# Patient Record
Sex: MALE | Race: ASIAN | NOT HISPANIC OR LATINO | Employment: FULL TIME | URBAN - METROPOLITAN AREA
[De-identification: names, ages, dates, MRNs, and addresses within clinical notes are randomized per-mention and may not be internally consistent; named-entity substitution may affect disease eponyms.]

---

## 2023-08-22 NOTE — CONFIDENTIAL NOTE
RECORDS RECEIVED FROM: internal /ce   DATE RECEIVED: 9.13.23    NOTES (FOR ALL VISITS) STATUS DETAILS   OFFICE NOTES from referring provider internal  Self referred, per patient records are at     OFFICE NOTES from other specialist ce - 5/8/23, 5.1.23- Bill      MEDICATION LIST internal       LABS     DIABETES: HBGA1C, CREATININE, FASTING LIPIDS, MICROALBUMIN URINE, POTASSIUM, TSH, T4    THYROID: TSH, T4, CBC, THYRODLONULIN, TOTAL T3, FREE T4, CALCITONIN, CEA ce  HBGA1C- 5.1.23  [facility]- 5.1.23           
independent

## 2023-09-13 ENCOUNTER — LAB (OUTPATIENT)
Dept: LAB | Facility: CLINIC | Age: 53
End: 2023-09-13
Payer: COMMERCIAL

## 2023-09-13 ENCOUNTER — PRE VISIT (OUTPATIENT)
Dept: ENDOCRINOLOGY | Facility: CLINIC | Age: 53
End: 2023-09-13

## 2023-09-13 ENCOUNTER — OFFICE VISIT (OUTPATIENT)
Dept: ENDOCRINOLOGY | Facility: CLINIC | Age: 53
End: 2023-09-13
Payer: COMMERCIAL

## 2023-09-13 VITALS
SYSTOLIC BLOOD PRESSURE: 166 MMHG | HEIGHT: 64 IN | OXYGEN SATURATION: 96 % | TEMPERATURE: 98.5 F | DIASTOLIC BLOOD PRESSURE: 95 MMHG | WEIGHT: 219 LBS | HEART RATE: 94 BPM | BODY MASS INDEX: 37.39 KG/M2

## 2023-09-13 DIAGNOSIS — E11.42 TYPE 2 DIABETES MELLITUS WITH DIABETIC POLYNEUROPATHY, WITH LONG-TERM CURRENT USE OF INSULIN (H): Primary | ICD-10-CM

## 2023-09-13 DIAGNOSIS — E11.9 DIABETES MELLITUS (H): ICD-10-CM

## 2023-09-13 DIAGNOSIS — Z79.4 TYPE 2 DIABETES MELLITUS WITH DIABETIC POLYNEUROPATHY, WITH LONG-TERM CURRENT USE OF INSULIN (H): Primary | ICD-10-CM

## 2023-09-13 LAB
ALBUMIN SERPL BCG-MCNC: 4.3 G/DL (ref 3.5–5.2)
ALP SERPL-CCNC: 96 U/L (ref 40–129)
ALT SERPL W P-5'-P-CCNC: 33 U/L (ref 0–70)
ANION GAP SERPL CALCULATED.3IONS-SCNC: 11 MMOL/L (ref 7–15)
AST SERPL W P-5'-P-CCNC: 24 U/L (ref 0–45)
BILIRUB SERPL-MCNC: 0.3 MG/DL
BUN SERPL-MCNC: 31.2 MG/DL (ref 6–20)
CALCIUM SERPL-MCNC: 9.5 MG/DL (ref 8.6–10)
CHLORIDE SERPL-SCNC: 101 MMOL/L (ref 98–107)
CHOLEST SERPL-MCNC: 140 MG/DL
CREAT SERPL-MCNC: 2.18 MG/DL (ref 0.67–1.17)
CREAT UR-MCNC: 62.5 MG/DL
DEPRECATED HCO3 PLAS-SCNC: 23 MMOL/L (ref 22–29)
EGFRCR SERPLBLD CKD-EPI 2021: 36 ML/MIN/1.73M2
ERYTHROCYTE [DISTWIDTH] IN BLOOD BY AUTOMATED COUNT: 12.4 % (ref 10–15)
GLUCOSE SERPL-MCNC: 483 MG/DL (ref 70–99)
HBA1C MFR BLD: 9.5 % (ref 4.3–?)
HCT VFR BLD AUTO: 35.6 % (ref 40–53)
HDLC SERPL-MCNC: 47 MG/DL
HGB BLD-MCNC: 12.5 G/DL (ref 13.3–17.7)
LDLC SERPL CALC-MCNC: 39 MG/DL
MCH RBC QN AUTO: 29.4 PG (ref 26.5–33)
MCHC RBC AUTO-ENTMCNC: 35.1 G/DL (ref 31.5–36.5)
MCV RBC AUTO: 84 FL (ref 78–100)
MICROALBUMIN UR-MCNC: 479 MG/L
MICROALBUMIN/CREAT UR: 766.4 MG/G CR (ref 0–17)
NONHDLC SERPL-MCNC: 93 MG/DL
PLATELET # BLD AUTO: 142 10E3/UL (ref 150–450)
POTASSIUM SERPL-SCNC: 4.7 MMOL/L (ref 3.4–5.3)
PROT SERPL-MCNC: 7.5 G/DL (ref 6.4–8.3)
RBC # BLD AUTO: 4.25 10E6/UL (ref 4.4–5.9)
SODIUM SERPL-SCNC: 135 MMOL/L (ref 136–145)
TRIGL SERPL-MCNC: 268 MG/DL
TSH SERPL DL<=0.005 MIU/L-ACNC: 1.82 UIU/ML (ref 0.3–4.2)
WBC # BLD AUTO: 7.7 10E3/UL (ref 4–11)

## 2023-09-13 PROCEDURE — 85027 COMPLETE CBC AUTOMATED: CPT | Performed by: PATHOLOGY

## 2023-09-13 PROCEDURE — 82570 ASSAY OF URINE CREATININE: CPT | Performed by: STUDENT IN AN ORGANIZED HEALTH CARE EDUCATION/TRAINING PROGRAM

## 2023-09-13 PROCEDURE — 99000 SPECIMEN HANDLING OFFICE-LAB: CPT | Performed by: PATHOLOGY

## 2023-09-13 PROCEDURE — 99205 OFFICE O/P NEW HI 60 MIN: CPT | Performed by: STUDENT IN AN ORGANIZED HEALTH CARE EDUCATION/TRAINING PROGRAM

## 2023-09-13 PROCEDURE — 36415 COLL VENOUS BLD VENIPUNCTURE: CPT | Performed by: PATHOLOGY

## 2023-09-13 PROCEDURE — 80053 COMPREHEN METABOLIC PANEL: CPT | Performed by: PATHOLOGY

## 2023-09-13 PROCEDURE — 84443 ASSAY THYROID STIM HORMONE: CPT | Performed by: PATHOLOGY

## 2023-09-13 PROCEDURE — 80061 LIPID PANEL: CPT | Performed by: PATHOLOGY

## 2023-09-13 PROCEDURE — 83036 HEMOGLOBIN GLYCOSYLATED A1C: CPT | Performed by: STUDENT IN AN ORGANIZED HEALTH CARE EDUCATION/TRAINING PROGRAM

## 2023-09-13 RX ORDER — CLONIDINE HYDROCHLORIDE 0.1 MG/1
1 TABLET ORAL 2 TIMES DAILY
COMMUNITY
Start: 2023-08-07 | End: 2024-03-27

## 2023-09-13 RX ORDER — ROSUVASTATIN CALCIUM 10 MG/1
1 TABLET, COATED ORAL DAILY
COMMUNITY
Start: 2023-08-10

## 2023-09-13 RX ORDER — PREGABALIN 75 MG/1
1 CAPSULE ORAL AT BEDTIME
COMMUNITY
Start: 2023-08-07 | End: 2023-11-29 | Stop reason: DRUGHIGH

## 2023-09-13 RX ORDER — INSULIN GLARGINE-YFGN 100 [IU]/ML
35 INJECTION, SOLUTION SUBCUTANEOUS DAILY
Qty: 10 ML | Refills: 0 | Status: SHIPPED | OUTPATIENT
Start: 2023-09-13 | End: 2023-09-13

## 2023-09-13 RX ORDER — LANCETS
EACH MISCELLANEOUS
COMMUNITY
Start: 2023-07-26

## 2023-09-13 RX ORDER — METOPROLOL SUCCINATE 100 MG/1
100 TABLET, EXTENDED RELEASE ORAL DAILY
COMMUNITY
Start: 2023-09-07 | End: 2024-09-06

## 2023-09-13 RX ORDER — FLASH GLUCOSE SENSOR
KIT MISCELLANEOUS
Qty: 2 EACH | Refills: 5 | Status: SHIPPED | OUTPATIENT
Start: 2023-09-13 | End: 2023-12-13

## 2023-09-13 RX ORDER — TIRZEPATIDE 2.5 MG/.5ML
2.5 INJECTION, SOLUTION SUBCUTANEOUS
Qty: 2 ML | Refills: 0 | Status: SHIPPED | OUTPATIENT
Start: 2023-09-13 | End: 2023-10-02

## 2023-09-13 RX ORDER — INSULIN ASPART 100 [IU]/ML
15 INJECTION, SUSPENSION SUBCUTANEOUS 2 TIMES DAILY WITH MEALS
COMMUNITY
Start: 2023-08-18 | End: 2023-11-29

## 2023-09-13 RX ORDER — INSULIN GLARGINE-YFGN 100 [IU]/ML
40 INJECTION, SOLUTION SUBCUTANEOUS DAILY
Qty: 10 ML | Refills: 0 | Status: SHIPPED | OUTPATIENT
Start: 2023-09-13 | End: 2023-09-29

## 2023-09-13 RX ORDER — LOSARTAN POTASSIUM AND HYDROCHLOROTHIAZIDE 12.5; 5 MG/1; MG/1
1 TABLET ORAL DAILY
COMMUNITY
Start: 2023-08-10 | End: 2023-12-13

## 2023-09-13 RX ORDER — METFORMIN HCL 500 MG
TABLET, EXTENDED RELEASE 24 HR ORAL
COMMUNITY
Start: 2023-08-10 | End: 2023-09-13

## 2023-09-13 RX ORDER — METFORMIN HCL 500 MG
TABLET, EXTENDED RELEASE 24 HR ORAL
Qty: 240 TABLET | Refills: 3 | Status: SHIPPED | OUTPATIENT
Start: 2023-09-13 | End: 2023-12-13

## 2023-09-13 ASSESSMENT — PAIN SCALES - GENERAL: PAINLEVEL: MILD PAIN (2)

## 2023-09-13 NOTE — PROGRESS NOTES
Endocrinology Clinic Visit 9/13/2023    NAME:  Mayito Toth  PCP:  Bill Jayden KASEY  MRN:  [id number]  Reason for Consult:  uncontrolled DM2  Requesting Provider:  Referred Self    Chief Complaint     Chief Complaint   Patient presents with    Diabetes       History of Present Illness     Mayito Toth is a 52 year old male who is seen in clinic for uncontrolled MD2. Here with his wife.    The patient was diagnosed with type 2 diabetes at age of early 40s, it was found on his annual blood tests. He was on metformin for few years then placed on insulin and has been on Novolog 70/30 since then. He was managed by his physician in [facility], moved to [place] 4/2023.   He reported feeling low twice a week during the morning this past week, he does not usually feel low. No changes in his regimen for a long time. He said he was not very compliant with his insulin back in [facility] but he has been taking it more regularly since moving here. He did seem that he was struggling with language to answer my questions and I had to repeat myself multiple times; I offered him  service but he declined.    Previous A1C in records reviewed. Today A1C is 9.5 compared to previous.  Weight is up 6 kg since moving from [facility].    Wt Readings from Last 4 Encounters:   09/13/23 99.3 kg (219 lb)         Diabetes Care/Complications:   Eyes: unsure, reported blurry vision.  Kidneys: Cr 1.84 ON 9/7/23, GFR 44 on 9/7/23. Similar on labs 5/1/23. He said it was normal a year ago.   Nerves: bilateral feet numbness. No ulcer or infections. No amputation. He is on lyrica 75 mg daily. Not controlling his symptoms.   Smoking: no  Blood Pressure: BP high today. Meds losartan/Htcz 50-12.5 mg daily, metoprolol 100 mg daily,clonidine 0.1 mg bid   Lipids:  on 5/2023 on Crestor 10 mg daily  Macrovascular: no   Hypoglycemia: as above    Previous DM related Hospitalization:    Current treatment strategy:   Metformin 500 mg  daily  Novolog 70/30 40 units in am and 30 units before dinner    Blood Glucose Monitoring:   He does not check his bg. He checked once last week was 195.     Diet:   3 meals  Breakfast: bread, coffee and egg  Snack bread, coffee with sugar  Lunch: rice and meat  Dinner: rice and meat  No bedtime snack  Drinks: sometimes drinks juice and soda. Alcohol occasional.     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       Problem List     DM2  CKD stage 3b  HTN  Obesity class 2       Medications     Current Outpatient Medications   Medication    cloNIDine (CATAPRES) 0.1 MG tablet    CONTOUR NEXT TEST test strip    losartan-hydrochlorothiazide (HYZAAR) 50-12.5 MG tablet    metFORMIN (GLUCOPHAGE XR) 500 MG 24 hr tablet    metoprolol succinate ER (TOPROL XL) 100 MG 24 hr tablet    Microlet Lancets MISC    NOVOLOG MIX 70/30 FLEXPEN (70-30) 100 UNIT/ML susp    pregabalin (LYRICA) 75 MG capsule    rosuvastatin (CRESTOR) 10 MG tablet     No current facility-administered medications for this visit.        Allergies     No Known Allergies    Medical / Surgical History     DM2  CKD stage 3b  HTN  Obesity class 2       Social History     Social History     Socioeconomic History    Marital status:      Spouse name: Not on file    Number of children: Not on file    Years of education: Not on file    Highest education level: Not on file   Occupational History    Not on file   Tobacco Use    Smoking status: Never    Smokeless tobacco: Never   Substance and Sexual Activity    Alcohol use: Not on file    Drug use: Never    Sexual activity: Not on file   Other Topics Concern    Not on file   Social History Narrative    Not on file     Social Determinants of Health     Financial Resource Strain: Not on file   Food Insecurity: Not on file   Transportation Needs: Not on file   Physical Activity: Not on file   Stress: Not on file   Social Connections: Not on file   Intimate Partner  "Violence: Not on file   Housing Stability: Not on file       Family History     No family history on file.    ROS     12 ROS completed, pertinent positive and negative in HPI    Physical Exam   BP (!) 166/95 (BP Location: Left arm, Patient Position: Sitting, Cuff Size: Adult Regular)   Pulse 94   Temp 98.5  F (36.9  C) (Oral)   Ht 1.626 m (5' 4\")   Wt 99.3 kg (219 lb)   SpO2 96%   BMI 37.59 kg/m       General: Comfortable, no obvious distress, normal body habitus  Eyes: Sclera anicteric, moist conjunctiva  HENT: Atraumatic, oropharynx clear, moist mucous membranes with no mucosal ulcerations  Neck: Trachea midline, supple.    CV: normal rate.   Resp:  good effort, no evidence of loud wheezing  Abdomen:  obese, non distended.   Skin: No rashes, lesions, or subcutaneous nodules on exposed skin.   Psych: Alert and oriented x 3. Appropriate affect, good insight  Extremities: No peripheral edema  Musculoskeletal: Appropriate muscle bulk and strength  Neuro: Moves all four extremities. No focal deficits on limited exam. Gait normal.     Labs/Imaging     Pertinent Labs were reviewed and updated in EPIC and discussed briefly.  Radiology Results were  reviewed and updated in EPIC.    Summary of recent findings:   No results found for: A1C    No results found for: TSH, T4    No results found for: CR    No results for input(s): CHOL, HDL, LDL, TRIG, CHOLHDLRATIO in the last 10379 hours.    No results found for: [id number], [id number], [id number]    I personally reviewed the patient's outside records from [facility] EMR and Care Everywhere. Summary of pertinent findings in HPI.    Impression / Plan     1. Diabetes Mellitus: Type 2  2. Obesity class 2    He recently moved from [facility] and I have no records on him. Probably long history of noncompliance and poorly controlled DM2. He is on mixed insulin and seems that since he restated taking it he has been getting low BG. It looks like his insurance covers Glargine and " Mounjaro which would be a better combination for him.  I think language is a barrier here, I had to repeat myself multiple times , same thing happened with Ella when she was checking him out.  I believe he would benefit from CDE but he declined.  MTM referral to titrate his Mounjato and glargine  He has no problem increasing his metformin, will plan to titrate up slowly to 2g daily  Martin personal cgm prescribed      2. Diabetes Complications: probably his CKD is due to DM. No urine alb check. No eye exam. Both ordered. Neuropathy might improve with better BG control. He is on lyrica.      3. Blood Pressure Management: Blood pressure is uncontrolled. Currently is multiple pharmacotherapy for this. I am not sure when he started tking his BP meds. If continues to b uncontrolled, nxt plan is to increase his losartan to 100 mg daily     4.Lipid Management: Per the new ACC/PETRA/NHLBI guidelines, statins are recommended for individuals with diabetes aged 40-75 with LDL  without ASCVD, and for any individual with ASCVD. Currently the patient is on a statin.      5. Smoking Status: Patient Pt is smoke free..     Review of the result(s) of each unique test - A1c and diabetes related labs.  60 minutes spent on the date of the encounter doing chart review, history and exam, documentation and further activities per the note           Test and/or medications prescribed today:  Orders Placed This Encounter   Procedures    Hemoglobin A1c POCT         Follow up: 3 month      Jayson Castellano MD  Endocrinology, Diabetes and Metabolism  [facility]

## 2023-09-13 NOTE — LETTER
9/13/2023       RE: Mayito Toth  [address]  [place] [address]     Dear Colleague,    Thank you for referring your patient, Mayito Toth, to the [facility] ENDOCRIN[facility] [place] at [facility]. Please see a copy of my visit note below.    Endocrinology Clinic Visit 9/13/2023    NAME:  Mayito Toth  PCP:  Jayden Khan  MRN:  [id number]  Reason for Consult:  uncontrolled DM2  Requesting Provider:  Referred Self    Chief Complaint     Chief Complaint   Patient presents with    Diabetes       History of Present Illness     Mayito Toth is a 52 year old male who is seen in clinic for uncontrolled MD2. Here with his wife.    The patient was diagnosed with type 2 diabetes at age of early 40s, it was found on his annual blood tests. He was on metformin for few years then placed on insulin and has been on Novolog 70/30 since then. He was managed by his physician in [facility], moved to [place] 4/2023.   He reported feeling low twice a week during the morning this past week, he does not usually feel low. No changes in his regimen for a long time. He said he was not very compliant with his insulin back in [facility] but he has been taking it more regularly since moving here. He did seem that he was struggling with language to answer my questions and I had to repeat myself multiple times; I offered him  service but he declined.    Previous A1C in records reviewed. Today A1C is 9.5 compared to previous.  Weight is up 6 kg since moving from [facility].    Wt Readings from Last 4 Encounters:   09/13/23 99.3 kg (219 lb)         Diabetes Care/Complications:   Eyes: unsure, reported blurry vision.  Kidneys: Cr 1.84 ON 9/7/23, GFR 44 on 9/7/23. Similar on labs 5/1/23. He said it was normal a year ago.   Nerves: bilateral feet numbness. No ulcer or infections. No amputation. He is on lyrica 75 mg daily. Not controlling his symptoms.    Smoking: no  Blood Pressure: BP high today. Meds losartan/Htcz 50-12.5 mg daily, metoprolol 100 mg daily,clonidine 0.1 mg bid   Lipids:  on 5/2023 on Crestor 10 mg daily  Macrovascular: no   Hypoglycemia: as above    Previous DM related Hospitalization:    Current treatment strategy:   Metformin 500 mg daily  Novolog 70/30 40 units in am and 30 units before dinner    Blood Glucose Monitoring:   He does not check his bg. He checked once last week was 195.     Diet:   3 meals  Breakfast: bread, coffee and egg  Snack bread, coffee with sugar  Lunch: rice and meat  Dinner: rice and meat  No bedtime snack  Drinks: sometimes drinks juice and soda. Alcohol occasional.     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       Problem List     DM2  CKD stage 3b  HTN  Obesity class 2       Medications     Current Outpatient Medications   Medication    cloNIDine (CATAPRES) 0.1 MG tablet    CONTOUR NEXT TEST test strip    losartan-hydrochlorothiazide (HYZAAR) 50-12.5 MG tablet    metFORMIN (GLUCOPHAGE XR) 500 MG 24 hr tablet    metoprolol succinate ER (TOPROL XL) 100 MG 24 hr tablet    Microlet Lancets MISC    NOVOLOG MIX 70/30 FLEXPEN (70-30) 100 UNIT/ML susp    pregabalin (LYRICA) 75 MG capsule    rosuvastatin (CRESTOR) 10 MG tablet     No current facility-administered medications for this visit.        Allergies     No Known Allergies    Medical / Surgical History     DM2  CKD stage 3b  HTN  Obesity class 2       Social History     Social History     Socioeconomic History    Marital status:      Spouse name: Not on file    Number of children: Not on file    Years of education: Not on file    Highest education level: Not on file   Occupational History    Not on file   Tobacco Use    Smoking status: Never    Smokeless tobacco: Never   Substance and Sexual Activity    Alcohol use: Not on file    Drug use: Never    Sexual activity: Not on file   Other Topics Concern     "Not on file   Social History Narrative    Not on file     Social Determinants of Health     Financial Resource Strain: Not on file   Food Insecurity: Not on file   Transportation Needs: Not on file   Physical Activity: Not on file   Stress: Not on file   Social Connections: Not on file   Intimate Partner Violence: Not on file   Housing Stability: Not on file       Family History     No family history on file.    ROS     12 ROS completed, pertinent positive and negative in HPI    Physical Exam   BP (!) 166/95 (BP Location: Left arm, Patient Position: Sitting, Cuff Size: Adult Regular)   Pulse 94   Temp 98.5  F (36.9  C) (Oral)   Ht 1.626 m (5' 4\")   Wt 99.3 kg (219 lb)   SpO2 96%   BMI 37.59 kg/m       General: Comfortable, no obvious distress, normal body habitus  Eyes: Sclera anicteric, moist conjunctiva  HENT: Atraumatic, oropharynx clear, moist mucous membranes with no mucosal ulcerations  Neck: Trachea midline, supple.    CV: normal rate.   Resp:  good effort, no evidence of loud wheezing  Abdomen:  obese, non distended.   Skin: No rashes, lesions, or subcutaneous nodules on exposed skin.   Psych: Alert and oriented x 3. Appropriate affect, good insight  Extremities: No peripheral edema  Musculoskeletal: Appropriate muscle bulk and strength  Neuro: Moves all four extremities. No focal deficits on limited exam. Gait normal.     Labs/Imaging     Pertinent Labs were reviewed and updated in EPIC and discussed briefly.  Radiology Results were  reviewed and updated in EPIC.    Summary of recent findings:   No results found for: A1C    No results found for: TSH, T4    No results found for: CR    No results for input(s): CHOL, HDL, LDL, TRIG, CHOLHDLRATIO in the last 84933 hours.    No results found for: [id number], [id number], [id number]    I personally reviewed the patient's outside records from [facility] EMR and Care Everywhere. Summary of pertinent findings in HPI.    Impression / Plan     1. Diabetes Mellitus: " Type 2  2. Obesity class 2    He recently moved from [facility] and I have no records on him. Probably long history of noncompliance and poorly controlled DM2. He is on mixed insulin and seems that since he restated taking it he has been getting low BG. It looks like his insurance covers Glargine and Mounjaro which would be a better combination for him.  I think language is a barrier here, I had to repeat myself multiple times , same thing happened with Ella when she was checking him out.  I believe he would benefit from CDE but he declined.  MTM referral to titrate his Mounjato and glargine  He has no problem increasing his metformin, will plan to titrate up slowly to 2g daily  Martin personal cgm prescribed      2. Diabetes Complications: probably his CKD is due to DM. No urine alb check. No eye exam. Both ordered. Neuropathy might improve with better BG control. He is on lyrica.      3. Blood Pressure Management: Blood pressure is uncontrolled. Currently is multiple pharmacotherapy for this. I am not sure when he started tking his BP meds. If continues to b uncontrolled, nxt plan is to increase his losartan to 100 mg daily     4.Lipid Management: Per the new ACC/PETRA/NHLBI guidelines, statins are recommended for individuals with diabetes aged 40-75 with LDL  without ASCVD, and for any individual with ASCVD. Currently the patient is on a statin.      5. Smoking Status: Patient Pt is smoke free..     Review of the result(s) of each unique test - A1c and diabetes related labs.  60 minutes spent on the date of the encounter doing chart review, history and exam, documentation and further activities per the note           Test and/or medications prescribed today:  Orders Placed This Encounter   Procedures    Hemoglobin A1c POCT         Follow up: 3 month      Jayson Castellano MD  Endocrinology, Diabetes and Metabolism  [facility]

## 2023-09-13 NOTE — NURSING NOTE
"Chief Complaint   Patient presents with    Diabetes     Vital signs:  Temp: 98.5  F (36.9  C) Temp src: Oral BP: (!) 166/95 Pulse: 94     SpO2: 96 %     Height: 162.6 cm (5' 4\") Weight: 99.3 kg (219 lb)  Estimated body mass index is 37.59 kg/m  as calculated from the following:    Height as of this encounter: 1.626 m (5' 4\").    Weight as of this encounter: 99.3 kg (219 lb).        "

## 2023-09-13 NOTE — PATIENT INSTRUCTIONS
It was nice meeting you.    Today we discussed:  - increase Metformin as discussed slowly to a total of 2 g ( I wort it on your prescription)  - stop your mix insulin and start Glargine 40 units daily  - start Mounjaro with monthly titration  - personal CGM prescribed  - schedule appointment with diabetes educator and Pharm D.

## 2023-09-29 DIAGNOSIS — E11.42 TYPE 2 DIABETES MELLITUS WITH DIABETIC POLYNEUROPATHY, WITH LONG-TERM CURRENT USE OF INSULIN (H): ICD-10-CM

## 2023-09-29 DIAGNOSIS — Z79.4 TYPE 2 DIABETES MELLITUS WITH DIABETIC POLYNEUROPATHY, WITH LONG-TERM CURRENT USE OF INSULIN (H): ICD-10-CM

## 2023-09-29 RX ORDER — INSULIN GLARGINE-YFGN 100 [IU]/ML
40 INJECTION, SOLUTION SUBCUTANEOUS DAILY
Qty: 15 ML | Refills: 1 | Status: SHIPPED | OUTPATIENT
Start: 2023-09-29 | End: 2023-12-26 | Stop reason: ALTCHOICE

## 2023-09-29 NOTE — TELEPHONE ENCOUNTER
Insulin Glargine-yfgn (SEMGLEE, YFGN,) 100 UNIT/ML SOLN         Last Written Prescription Date:  9/13/23  Last Fill Quantity: 10 ml,   # refills: 0  Last Office Visit : 9/13/23  Future Office visit:  12/13/23    Routing refill request to provider for review/approval because:  Insulin - refilled per clinic

## 2023-10-02 DIAGNOSIS — E11.42 TYPE 2 DIABETES MELLITUS WITH DIABETIC POLYNEUROPATHY, WITH LONG-TERM CURRENT USE OF INSULIN (H): ICD-10-CM

## 2023-10-02 DIAGNOSIS — Z79.4 TYPE 2 DIABETES MELLITUS WITH DIABETIC POLYNEUROPATHY, WITH LONG-TERM CURRENT USE OF INSULIN (H): ICD-10-CM

## 2023-10-03 RX ORDER — TIRZEPATIDE 2.5 MG/.5ML
2.5 INJECTION, SOLUTION SUBCUTANEOUS
Qty: 2 ML | Refills: 0 | Status: SHIPPED | OUTPATIENT
Start: 2023-10-03 | End: 2023-10-26

## 2023-10-03 NOTE — TELEPHONE ENCOUNTER
tirzepatide (MOUNJARO) 2.5 MG/0.5ML pen     Last Written Prescription Date:   9/13/2023  Last Fill Quantity: 2,   # refills: 0  Last Office Visit :  9/13/2023  Future Office visit:  12/13/2023    Routing refill request to provider for review/approval because:  Abnormal Creatinine  Refer to clinic for review   Recent Labs   Lab Test 09/13/23  1144   CR 2.18*     Ayana Waller RN  Central Triage Red Flags/Med Refills    GLP-1 Agonists Protocol Nnacim23/02/2023 01:53 PM   Protocol Details Normal serum creatinine on file in past 12 months

## 2023-10-23 NOTE — PROGRESS NOTES
"Disease State Management Encounter:                          Mayito Toth is a 52 year old male coming in for for an initial visit. He was referred to me from Dr. Castellano. Also present today is wife who helps manage his care.    Reason for visit:      Medication Adherence/Access: BCBS    Type 2 Diabetes:   Diabetes Medication(s)       Biguanides       metFORMIN (GLUCOPHAGE XR) 500 MG 24 hr tablet    1000 mg daily       Insulin       Insulin Glargine-yfgn (SEMGLEE, YFGN,) 100 UNIT/ML SOLN    Inject 35 Units Subcutaneous daily Dispense pen     NOVOLOG MIX 70/30 FLEXPEN (70-30) 100 UNIT/ML susp    INJECT 35 UNITS SUBCUTANEOUSLY IN THE MORNING AND 25 IN THE EVENING      Incretin Mimetic Agents       tirzepatide (MOUNJARO) 2.5 MG/0.5ML pen    Inject 2.5 mg Subcutaneous every 7 days     Martin ([place]) -- unable to download data today in clinic so viewed time in range on device    Time in [employer] (last 7 days):   Above - 13%  In [employer] - 87%    Statin: Yes: Crestor 10 mg daily   ACEi/ARB: No.     Urine Albumin:   Lab Results   Component Value Date    UMALCR 766.40 (H) 09/13/2023        Lab Results   Component Value Date    GFRESTIMATED 36 (L) 09/13/2023     Estimated body mass index is 37.59 kg/m  as calculated from the following:    Height as of 9/13/23: 5' 4\" (1.626 m).    Weight as of 9/13/23: 219 lb (99.3 kg).     BP Readings from Last 1 Encounters:   09/13/23 (!) 166/95     Pulse Readings from Last 1 Encounters:   09/13/23 94     Wt Readings from Last 1 Encounters:   09/13/23 219 lb (99.3 kg)     Ht Readings from Last 1 Encounters:   09/13/23 5' 4\" (1.626 m)     Estimated body mass index is 37.59 kg/m  as calculated from the following:    Height as of 9/13/23: 5' 4\" (1.626 m).    Weight as of 9/13/23: 219 lb (99.3 kg).    Temp Readings from Last 1 Encounters:   09/13/23 98.5  F (36.9  C) (Oral)   Last week of Feb going to [facility] x 2-3 months    Assessment/Plan: Although A1C above goal of < 7%, since initiation " of Mounjaro and insulin glargine, patient is now achieving time in range goal greater than 70% without hypoglycemia.  Per last visit with Dr. Castellano, patient was to replace Lantus with his NovoLog mix.  Patient is still taking NovoLog Mix at a lower dose.  It may be reasonable to taper this over the next month in anticipation of Mounjaro effect.  We will send next dose for Mounjaro instructions for tapering and defer to Emmy next week for further guidance.    INCREASE Mounjaro to 5 mg weekly on Tuesday, November 14th       On that day, DECREASE Novolog 70/30 to 30 units in the morning and 20 units in the evening    Do not increase metformin to more than 2 pills per day due to kidney function    Call me anytime if lows < 70 or if you have issues getting Mounjaro filled   [phone]    I will call you Monday, December 4th at 3 PM    Medication issues to be addressed at a future visit    Consider ACEi/ARB low dose or SGLT-2 inhibitor for clinic microalbuminuria. Watch eGFR -- currently 36      Endocrine Team & Next Follow-Up:  11/3/2023 with Emmy Serrano RN, [facility]  12/4/2023 with Bob  12/13/2023 with Dr. Castellano      I spent 30 minutes with this patient today. All changes were made via collaborative practice agreement with Dr. Castellano. A copy of the visit note was provided to the patient's provider(s).    A summary of these recommendations was given to the patient.    Bob Chung, PharmD, [facility]  Endocrine & Diabetes [facility] Pharmacist  [address] [place] [id number]  Direct Voicemail: [phone]         Medication Therapy Recommendations  Type 2 diabetes mellitus with hyperglycemia, with long-term current use of insulin (H)    Current Medication: tirzepatide (MOUNJARO) 2.5 MG/0.5ML pen (Discontinued)   Rationale: Dose too low - Dosage too low - Effectiveness   Recommendation: Increase Dose   Status: Accepted per CPA

## 2023-10-26 ENCOUNTER — TELEPHONE (OUTPATIENT)
Dept: ENDOCRINOLOGY | Facility: CLINIC | Age: 53
End: 2023-10-26

## 2023-10-26 ENCOUNTER — OFFICE VISIT (OUTPATIENT)
Dept: PHARMACY | Facility: CLINIC | Age: 53
End: 2023-10-26
Payer: COMMERCIAL

## 2023-10-26 DIAGNOSIS — E11.65 TYPE 2 DIABETES MELLITUS WITH HYPERGLYCEMIA, WITH LONG-TERM CURRENT USE OF INSULIN (H): Primary | ICD-10-CM

## 2023-10-26 DIAGNOSIS — Z79.4 TYPE 2 DIABETES MELLITUS WITH HYPERGLYCEMIA, WITH LONG-TERM CURRENT USE OF INSULIN (H): Primary | ICD-10-CM

## 2023-10-26 PROCEDURE — 99207 PR NO CHARGE LOS: CPT

## 2023-10-26 RX ORDER — TIRZEPATIDE 5 MG/.5ML
5 INJECTION, SOLUTION SUBCUTANEOUS
Qty: 2 ML | Refills: 0 | Status: SHIPPED | OUTPATIENT
Start: 2023-10-26 | End: 2023-12-27 | Stop reason: ALTCHOICE

## 2023-10-26 NOTE — PATIENT INSTRUCTIONS
INCREASE Mounjaro to 5 mg weekly on Tuesday, November 14th   On that day, DECREASE Novolog to 30 units in the morning and 20 units in the evening  Call me anytime if lows < 70 or if you have issues getting Mounjaro filled   [phone]  I will call you Monday, December 4th at 3 PM        Bob Chung, PharmD, [facility]  Endocrine & Diabetes [place] Pharmacist  [address] [place] [id number]

## 2023-10-26 NOTE — Clinical Note
"Eliecer Booth -- new patient to our clinic, so a little bit of a \"too many cooks in the kitchen\" moment to start while he meets everyone. You meet with patient 11/3. Wife helps manage his care and will probably be present. Of note, I made the mistake of not reading Dr. Castellano's note prior to meeting with patient. Intent was to REPLACE 70/30 with Lantus. Patient was taking both. I didn't question because his time in range was fantastic without lows (87%). I gave some recs to back off the 70/30 when we increase Mounjaro next month, but feel free to adjust further based on how his numbers are. I have f/up scheduled 12/4 and can help titrate Mounjaro/adjust insulin from there. Thank you :)"

## 2023-10-26 NOTE — TELEPHONE ENCOUNTER
PA Initiation    Medication: MOUNJARO 5 MG/0.5ML SC SOPN  Insurance Company: [employer] [place] - Phone [phone] Fax [phone]  Pharmacy Filling the Rx:    Filling Pharmacy Phone:    Filling Pharmacy Fax:    Start Date: 10/26/2023     Key: [id number]

## 2023-10-27 NOTE — TELEPHONE ENCOUNTER
Prior Authorization Approval    Medication: MOUNJARO 5 MG/0.5ML SC SOPN  Authorization Effective Date: 10/26/2023  Authorization Expiration Date: 10/27/2024  Approved Dose/Quantity: 2ml/28 days   Reference #: [id number]   Insurance Company: [employer] [place] - Phone [phone] Fax [phone]  Expected CoPay: $ 0  CoPay Card Available:      Financial Assistance Needed: no  Which Pharmacy is filling the prescription:    Pharmacy Notified: no  Patient Notified: pharmacy will notify patient

## 2023-11-03 ENCOUNTER — ALLIED HEALTH/NURSE VISIT (OUTPATIENT)
Dept: EDUCATION SERVICES | Facility: CLINIC | Age: 53
End: 2023-11-03
Payer: COMMERCIAL

## 2023-11-03 DIAGNOSIS — E11.42 TYPE 2 DIABETES MELLITUS WITH DIABETIC POLYNEUROPATHY, WITH LONG-TERM CURRENT USE OF INSULIN (H): ICD-10-CM

## 2023-11-03 DIAGNOSIS — Z79.4 TYPE 2 DIABETES MELLITUS WITH DIABETIC POLYNEUROPATHY, WITH LONG-TERM CURRENT USE OF INSULIN (H): ICD-10-CM

## 2023-11-03 PROCEDURE — G0108 DIAB MANAGE TRN  PER INDIV: HCPCS | Performed by: REGISTERED NURSE

## 2023-11-03 NOTE — PATIENT INSTRUCTIONS
Tonight reduce your Novolog 70/30 dose to 15 units in the evening before dinner.   Reduce your morning dose of Novolog 70/30 to 20 units.      Increase your Lantus dose to 45 units.      Keep your Metformin dose at 500 mg morning and evening.      On November 10 (Wednesday) start taking the 5 mg dose of Mounjaro once a week.       If you start having ANY hypoglycemia, please call right away so we can adjust your insulin.      Emmy Serrano, TIFFANYN, RN, [facility]  Certified Diabetes Care and   [facility] Endocrinology and Diabetes   Cl[facility]  Clinic [id number]  Phone [phone]

## 2023-11-04 VITALS — WEIGHT: 219 LBS | BODY MASS INDEX: 37.59 KG/M2

## 2023-11-04 NOTE — PROGRESS NOTES
Diabetes Self-Management Education & Support    Mayito Toth presents today for education related to Type 2 diabetes    Patient is being treated with:  insulin, Metformin and GLP-1  He is accompanied by spouse, Vika.    Year of diagnosis: Around 2012 or so.  He thinks he has had diabetes for about 8 to 10 years.   Referring provider:  Jayson Castellano MD  Living Situation: Lives with his wife, Vika.  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    PATIENT CONCERNS RELATED TO DIABETES SELF MANAGEMENT:     Previously on Metformin and Novolog 70/30 insulin twice daily.   Saw Dr. Castellano September 19, 2023 and Lantus insulin was prescribed as well as Mounjaro 2.5 mg weekly.  It appears from her note that her intention was that the Lantus replace the Novolog 70/30 insulin.   Following this, he met with Bob Chung MTM PharmD, who noted that Mayito had started taking the Lantus insulin but was still taking the Novolog 70/30 and maintaining a healthy time in range.  At that visit, the 70/30 insulin was reduced.    He was instructed to increase the Mounjaro dose to 5 mg starting November 14.      ASSESSMENT:    Taking Medication:     Current Diabetes Management per Patient:  Taking diabetes medications:  He reports that currently he is taking the following:   Novolog 70/30:  35 units in the morning, and 25 units before dinner (Note that he had been instructed to decrease this dose to 30 in AM and 20 in the PM.  Insulin Glargine (Semglee):  35 units daily  Mounjaro:  2.5 mg weekly.  The only side effect he has noticed is some more reflux symptoms if he lays down too soon after eating.  He feels ready to increase the dose.     Monitoring    Patient glucose self monitoring as follows: continuously using a continuous glucose monitor (CGM)  BG meter:  "Freestyle Martin 2  BG results: Report follows:          Patient's most recent No results found for: \"A1C\"   Patient's A1C goal: <7.0    EDUCATION and INSTRUCTION PROVIDED AT THIS VISIT:       Discussed some of the variability in his Martin report.  He has had some hypoglycemia since his visit with Bob, mostly during the night.  He states that he usually treats these lows by eating Oreo cookies until he feels better.  Discussed appropriate treatment for hypoglycemia and encourage him to limit treatment to 2 oreo cookies (about 17 grams CHO) and stop, recheck glucose is 15-20 minutes.      He feels ready to increase his Mounjaro dose to 5 mg.  Discussed making sure that he slows down how quickly he eats, pay attention to satiety signals and try to avoid eating right before going to bed.      Rather than stopping Novolog 70/30 altogether, I asked him to increase his Lantus dose to 40 units (currently taking 35 units) and reduce his Novolog 70/30 insulin to 20 in the morning (currently taking 35 units) and to 15 units in the evening before dinner.  He is currently taking his Mounjaro on Wednesdays, so I asked him to increase his dose to 5 mg on November 10, and we made a follow up appointment for November 15.  Instructed to call the clinic immediately if he starts having more hypoglycemia.      Patient-stated goal written and given to Mayito Toth.  Verbalized and demonstrated understanding of instructions.     PLAN:  See patient instructions  AVS printed and given to patient    FOLLOW-UP:      November 15 at 15:00 in person.     Time spent with patient at today's visit was 60 minutes.      Any diabetes medication dose changes were made via the CDE Protocol and Collaborative Practice Agreement with [place] and  [place].  A copy of this encounter was provided to patient's referring provider.    "

## 2023-11-15 ENCOUNTER — ALLIED HEALTH/NURSE VISIT (OUTPATIENT)
Dept: EDUCATION SERVICES | Facility: CLINIC | Age: 53
End: 2023-11-15
Payer: COMMERCIAL

## 2023-11-15 DIAGNOSIS — E11.9 TYPE 2 DIABETES MELLITUS (H): Primary | ICD-10-CM

## 2023-11-15 PROCEDURE — G0108 DIAB MANAGE TRN  PER INDIV: HCPCS | Performed by: REGISTERED NURSE

## 2023-11-15 NOTE — PATIENT INSTRUCTIONS
Diabetes Education Instructions:     Tomorrow increase your Semglee to 40 units  Stop taking the Novolog 70/30 insulin.   Increase your Mounjaro to 5 mg tonight.      Please let us know if you are having any more low blood glucose.     Don't be surprised if your blood sugars run a little higher over the next week or so.     I will order the Martin 3 sensors and a reader for you.   I'll let you know the status through My Chart messaging.

## 2023-11-16 NOTE — PROGRESS NOTES
"Diabetes Self-Management Education & Support    Mayito Toth presents today for education related to Type 2 diabetes    Patient is being treated with:  oral agents, insulin, and GLP-1 agonist  He is accompanied by spouse    Year of diagnosis: Around 2012 or so.  He thinks he has had diabetes for about 8 to 10 years.   Referring provider:  Jayson Castellano MD  Living Situation: Lives with his wife, Vika.  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    PATIENT CONCERNS RELATED TO DIABETES SELF MANAGEMENT:   He is here for followup.  At last visit, he was still taking Novolog 70/30 in addition to Lantus insulin.  We cut back, but did not discontinue the Novolog 70/30 and instructed him to increase his Lantus insulin from 35 to 40 units daily.        ASSESSMENT:    Taking Medication:     Current Diabetes Management per Patient:  Taking diabetes medications:  Semglee:  35 units daily (did not increase his dose)  Novolog 70/30:   15 units in the morning, 20 units in the evening.   He is going to take his first dose of Mounjaro 5 mg tonight.     Monitoring    Patient glucose self monitoring as follows: continuously using a continuous glucose monitor (CGM)  BG meter: Freestyle Martin 2  BG results:            He has been having hypoglycemia overnight, not seen here.      Patient's most recent No results found for: \"A1C\"   Patient's A1C goal:     Activity: no regular exercise program    Problem Solving:      Patient is at risk of hypoglycemia?: YES, Frequency is daily, Feels symptoms when glucose is between 60 and 70, and Usual treatment is too much carbohydrate.  He generally eats cookies  Hospitalizations for hyper or hypoglycemia: No    EDUCATION and INSTRUCTION PROVIDED AT THIS VISIT:       Discussed the action of the insulins he is currently " taking.  Since he is increasing his Mounjaro tonight from 2.5 mg to 5 mg, it seems most prudent at this point to increase his insulin glargine to 40 units and to discontinue the Novolog 70/30 insulin altogether.  We will need to continue to titrate up his glargine over the next weeks.      Discussed his current treatment of lows.  He is under 70 about 3% of the time according to the data from his Martin 2, however it is known that the Martin 2 flash glucose monitor (FGM) reads lower than BG and both Mayito and his wife have noticed this when the sensor is reading low and they do a BG to double check.  He would do well with a better sensor.  I will order the Martin 3 sensors for him, but the reader for it will need to be ordered from [employer] [facility].  Explained this to Mayito and his wife.      Also explained that he can expect that his glucoses will be a little higher than he's used to.  He will call if he has concerns or questions.      Reviewed appropriate recognition and treatment of hypoglycemia and encouraged him to avoid over-treating, particulary when treating a low identified by this Martin 2 sensor.      Follow up with PILAR Moralez, PharmD on 12/4.  I made another appointment with him on 11/29 to re-evaluate his glucose control and adjust his glargine.    He would like to know if there is anything to increase the amount of Lyrica he is taking for his neuropathy.  Will defer to Bob and Dr. Castellano.      Patient-stated goal written and given to Mayito Toth.  Verbalized and demonstrated understanding of instructions.     PLAN:  See patient instructions  AVS printed and given to patient    FOLLOW-UP:        Time spent with patient at today's visit was 30 minutes.      Any diabetes medication dose changes were made via the CDE Protocol and Collaborative Practice Agreement with [place] and [facility].  A copy of this encounter was provided to patient's referring provider.

## 2023-11-29 ENCOUNTER — ALLIED HEALTH/NURSE VISIT (OUTPATIENT)
Dept: EDUCATION SERVICES | Facility: CLINIC | Age: 53
End: 2023-11-29
Payer: COMMERCIAL

## 2023-11-29 DIAGNOSIS — E11.9 TYPE 2 DIABETES MELLITUS (H): Primary | ICD-10-CM

## 2023-11-29 PROCEDURE — G0108 DIAB MANAGE TRN  PER INDIV: HCPCS | Performed by: REGISTERED NURSE

## 2023-11-29 RX ORDER — TIRZEPATIDE 7.5 MG/.5ML
7.5 INJECTION, SOLUTION SUBCUTANEOUS
Qty: 2 ML | Refills: 0 | Status: SHIPPED | OUTPATIENT
Start: 2023-11-29 | End: 2023-12-26

## 2023-11-30 RX ORDER — PREGABALIN 150 MG/1
150 CAPSULE ORAL DAILY
Qty: 90 CAPSULE | Refills: 3 | Status: SHIPPED | OUTPATIENT
Start: 2023-11-30 | End: 2024-03-27

## 2023-12-01 NOTE — PROGRESS NOTES
"Diabetes Self-Management Education & Support    Mayito Toth presents today for education related to Type 2 diabetes    Patient is being treated with:  oral agents and insulin  He is accompanied by self and spouse    Year of diagnosis: Around 2012 or so.  He thinks he has had diabetes for about 8 to 10 years.   Referring provider:  Jayson Castellano MD  Living Situation: Lives with his wife, Vika.  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    PATIENT CONCERNS RELATED TO DIABETES SELF MANAGEMENT:   2 weeks ago we stopped the Novolog 70/30 insulin he was taking and had him increase the amount of Lantus he was taking to 40 units.  He also increased his dose of Mounjaro to 5 mg from 2.5 mg.  Here today to review.      ASSESSMENT:    Taking Medication:     Current Diabetes Management per Patient:  Taking diabetes medications:  Insulin glargine:  40 units daily  Metformin XR:   1000 mg BID  Tirzepatide:  5 mg weekly.     Monitoring    Patient glucose self monitoring as follows: continuously using a continuous glucose monitor (CGM)  BG meter: Freestyle Martin 2  BG results: See Libreview report below:            Patient's most recent No results found for: \"A1C\"   Patient's A1C goal: <7.0     Patient is at risk of hypoglycemia?: YES and Frequency is one to two times per month  Hospitalizations for hyper or hypoglycemia: No    EDUCATION and INSTRUCTION PROVIDED AT THIS VISIT:      States that he is tolerating the 5 mg dose of Mounjaro with occasional reflux, particularly if he eats later in the evening, which he is trying not to do.   Glucoses are well controlled with no hypoglycemia observed over the past two weeks.   He is most concerned about the numbness and coldness he is feeling in his feet and wonders if he can increase the dose of pregabalin " he is currently taking (75 mg daily).      Discussed plan going forward:  Since he is tolerating Mounjaro well, we will increase his dose to 7.5 mg in 2 weeks, when he has used his supply of 5 mg pens.  Leave insulin glargine at current dose.  Discussed increasing dose of Pregabalin with Dr. Castellano, who approved the increase.  Orders for Mounjaro 7.5 mg weekly and Pregabalin 150 mg daily sent to patient's [facility] pharmacy.  He has an appointment with Dr. Castellano on December 13, and an appointment with PILAR Moralez PharmD on December 4.  I scheduled a follow up with myself in January.      Patient-stated goal written and given to Mayito Toth.  Verbalized and demonstrated understanding of instructions.     PLAN:  See patient instructions  AVS printed and given to patient    FOLLOW-UP:    As above.    Time spent with patient at today's visit was 30 minutes.      Any diabetes medication dose changes were made via the CDE Protocol and Collaborative Practice Agreement with [place] and  [place].  A copy of this encounter was provided to patient's referring provider.

## 2023-12-04 ENCOUNTER — VIRTUAL VISIT (OUTPATIENT)
Dept: PHARMACY | Facility: CLINIC | Age: 53
End: 2023-12-04
Payer: COMMERCIAL

## 2023-12-04 DIAGNOSIS — E11.65 TYPE 2 DIABETES MELLITUS WITH HYPERGLYCEMIA, WITH LONG-TERM CURRENT USE OF INSULIN (H): Primary | ICD-10-CM

## 2023-12-04 DIAGNOSIS — Z79.4 TYPE 2 DIABETES MELLITUS WITH HYPERGLYCEMIA, WITH LONG-TERM CURRENT USE OF INSULIN (H): Primary | ICD-10-CM

## 2023-12-04 DIAGNOSIS — Z79.4 TYPE 2 DIABETES MELLITUS WITH DIABETIC POLYNEUROPATHY, WITH LONG-TERM CURRENT USE OF INSULIN (H): ICD-10-CM

## 2023-12-04 DIAGNOSIS — E11.42 TYPE 2 DIABETES MELLITUS WITH DIABETIC POLYNEUROPATHY, WITH LONG-TERM CURRENT USE OF INSULIN (H): ICD-10-CM

## 2023-12-04 PROCEDURE — 99207 PR NO CHARGE LOS: CPT

## 2023-12-04 NOTE — PROGRESS NOTES
"Disease State Management Encounter:                          Mayito Toth is a 52 year old male coming in for for a follow-up visit. He was referred to me from Dr. Castellano. Also present today is wife who helps manage his care.    Reason for visit:      Medication Adherence/Access: BCBS    Type 2 Diabetes:   Diabetes Medication(s)       Biguanides       metFORMIN (GLUCOPHAGE XR) 500 MG 24 hr tablet    1000 mg twice daily       Insulin       Insulin Glargine-yfgn (SEMGLEE, YFGN,) 100 UNIT/ML SOLN    Inject 40 Units Subcutaneous daily Dispense pen           Incretin Mimetic Agents       tirzepatide (MOUNJARO) 7.5 MG/0.5ML pen    Inject 2.5 mg Subcutaneous every 7 days         Statin: Yes: Crestor 10 mg daily   ACEi/ARB: No.     Urine Albumin:   Lab Results   Component Value Date    UMALCR 766.40 (H) 09/13/2023        Lab Results   Component Value Date    GFRESTIMATED 36 (L) 09/13/2023     Estimated body mass index is 37.59 kg/m  as calculated from the following:    Height as of 9/13/23: 5' 4\" (1.626 m).    Weight as of 11/3/23: 219 lb (99.3 kg).     BP Readings from Last 1 Encounters:   09/13/23 (!) 166/95     Pulse Readings from Last 1 Encounters:   09/13/23 94     Wt Readings from Last 1 Encounters:   11/03/23 219 lb (99.3 kg)     Ht Readings from Last 1 Encounters:   09/13/23 5' 4\" (1.626 m)     Estimated body mass index is 37.59 kg/m  as calculated from the following:    Height as of 9/13/23: 5' 4\" (1.626 m).    Weight as of 11/3/23: 219 lb (99.3 kg).    Temp Readings from Last 1 Encounters:   09/13/23 98.5  F (36.9  C) (Oral)   Last week of Feb going to [facility] x 2-3 months    Assessment/Plan:     Continue current plan per Emmy's note 11/29. Emmy will follow to titrate Mounjaro going forward  Scheduled call for February to troubleshoot insurance issues to get 3 months of Mounjaro when they go to the [facility]     Endocrine Team & Next Follow-Up:  1/17/2024 with Emmy Serrano RN, [facility]  2/15/2024 " with Bob  12/13/2023 with Dr. Castellano      I spent 30 minutes with this patient today. All changes were made via collaborative practice agreement with Dr. Castellano. A copy of the visit note was provided to the patient's provider(s).    A summary of these recommendations was given to the patient.    Bob Chung, PharmD, [facility]  Endocrine & Diabetes [facility] Pharmacist  [address] [place] [id number]  Direct Voicemail: [phone]         Medication Therapy Recommendations  No medication therapy recommendations to display

## 2023-12-12 PROBLEM — E11.9 DIABETES MELLITUS, TYPE II (H): Status: ACTIVE | Noted: 2023-05-01

## 2023-12-12 PROBLEM — Z86.11 HISTORY OF TB (TUBERCULOSIS): Status: ACTIVE | Noted: 2023-05-01

## 2023-12-12 PROBLEM — I10 HYPERTENSION: Status: ACTIVE | Noted: 2023-05-01

## 2023-12-12 PROBLEM — E78.5 HYPERLIPIDEMIA: Status: ACTIVE | Noted: 2023-05-01

## 2023-12-12 NOTE — PROGRESS NOTES
Outcome for 12/12/23 11:09 AM :Left Voicemail for patient to call back and to upload Martin data.  Appointment reminder phone call made to patient.   Ella Gonzalez

## 2023-12-13 ENCOUNTER — OFFICE VISIT (OUTPATIENT)
Dept: ENDOCRINOLOGY | Facility: CLINIC | Age: 53
End: 2023-12-13
Payer: COMMERCIAL

## 2023-12-13 ENCOUNTER — LAB (OUTPATIENT)
Dept: LAB | Facility: CLINIC | Age: 53
End: 2023-12-13
Payer: COMMERCIAL

## 2023-12-13 VITALS
OXYGEN SATURATION: 100 % | HEART RATE: 90 BPM | DIASTOLIC BLOOD PRESSURE: 80 MMHG | WEIGHT: 211 LBS | SYSTOLIC BLOOD PRESSURE: 122 MMHG | BODY MASS INDEX: 36.02 KG/M2 | HEIGHT: 64 IN

## 2023-12-13 DIAGNOSIS — E66.01 CLASS 2 SEVERE OBESITY DUE TO EXCESS CALORIES WITH SERIOUS COMORBIDITY AND BODY MASS INDEX (BMI) OF 36.0 TO 36.9 IN ADULT (H): Primary | ICD-10-CM

## 2023-12-13 DIAGNOSIS — E66.812 CLASS 2 SEVERE OBESITY DUE TO EXCESS CALORIES WITH SERIOUS COMORBIDITY AND BODY MASS INDEX (BMI) OF 36.0 TO 36.9 IN ADULT (H): Primary | ICD-10-CM

## 2023-12-13 DIAGNOSIS — E11.42 TYPE 2 DIABETES MELLITUS WITH DIABETIC POLYNEUROPATHY, WITH LONG-TERM CURRENT USE OF INSULIN (H): ICD-10-CM

## 2023-12-13 DIAGNOSIS — E11.9 DIABETES MELLITUS (H): Primary | ICD-10-CM

## 2023-12-13 DIAGNOSIS — Z79.4 TYPE 2 DIABETES MELLITUS WITH DIABETIC POLYNEUROPATHY, WITH LONG-TERM CURRENT USE OF INSULIN (H): ICD-10-CM

## 2023-12-13 LAB
ANION GAP SERPL CALCULATED.3IONS-SCNC: 9 MMOL/L (ref 7–15)
BUN SERPL-MCNC: 28.5 MG/DL (ref 6–20)
CALCIUM SERPL-MCNC: 9.8 MG/DL (ref 8.6–10)
CHLORIDE SERPL-SCNC: 102 MMOL/L (ref 98–107)
CREAT SERPL-MCNC: 1.72 MG/DL (ref 0.67–1.17)
DEPRECATED HCO3 PLAS-SCNC: 27 MMOL/L (ref 22–29)
EGFRCR SERPLBLD CKD-EPI 2021: 48 ML/MIN/1.73M2
GLUCOSE SERPL-MCNC: 129 MG/DL (ref 70–99)
HBA1C MFR BLD: 7.3 %
POTASSIUM SERPL-SCNC: 4.7 MMOL/L (ref 3.4–5.3)
SODIUM SERPL-SCNC: 138 MMOL/L (ref 135–145)

## 2023-12-13 PROCEDURE — 83036 HEMOGLOBIN GLYCOSYLATED A1C: CPT | Performed by: PATHOLOGY

## 2023-12-13 PROCEDURE — 80048 BASIC METABOLIC PNL TOTAL CA: CPT | Performed by: PATHOLOGY

## 2023-12-13 PROCEDURE — 99215 OFFICE O/P EST HI 40 MIN: CPT | Performed by: STUDENT IN AN ORGANIZED HEALTH CARE EDUCATION/TRAINING PROGRAM

## 2023-12-13 PROCEDURE — 36415 COLL VENOUS BLD VENIPUNCTURE: CPT | Performed by: PATHOLOGY

## 2023-12-13 RX ORDER — METFORMIN HCL 500 MG
500 TABLET, EXTENDED RELEASE 24 HR ORAL
Start: 2023-12-13 | End: 2023-12-27

## 2023-12-13 RX ORDER — LOSARTAN POTASSIUM AND HYDROCHLOROTHIAZIDE 12.5; 5 MG/1; MG/1
1 TABLET ORAL DAILY
Qty: 90 TABLET | Refills: 1 | Status: SHIPPED | OUTPATIENT
Start: 2023-12-13 | End: 2023-12-27

## 2023-12-13 ASSESSMENT — PAIN SCALES - GENERAL: PAINLEVEL: NO PAIN (0)

## 2023-12-13 NOTE — PATIENT INSTRUCTIONS
It was nice seeing you.    - lets check your kidney function today to make sure it is stable.  - decrease metformin to 500 mg daily.  - increase Mounjaro to 7.5 mg weekly  - decrease glargine to 35 unit(s) daily.

## 2023-12-13 NOTE — LETTER
12/13/2023       RE: Mayito Toth  [address]  [place] [address]     Dear Colleague,    Thank you for referring your patient, Mayito Toth, to the [facility] ENDOCRINOLOGY CLINIC [place] [facility]. Please see a copy of my visit note below.    Outcome for 12/12/23 11:09 AM :Left Voicemail for patient to call back and to upload Martin data.  Appointment reminder phone call made to patient.   Ella Gonzalez                                              Endocrinology Clinic Visit      NAME:  Mayito Toth  PCP:  Jayedn Khan  MRN:  [id number]  Reason for Consult:  uncontrolled DM2  Requesting Provider:  Referred Self    Chief Complaint     Chief Complaint   Patient presents with    Diabetes       History of Present Illness     Mayito Toth is a 52 year old male who is seen in clinic for uncontrolled DM2. Here with his wife. He established with me on 9/2023.    The patient was diagnosed with type 2 diabetes at age of early 40s, it was found on his annual blood tests. He was on metformin for few years then placed on insulin and has been on Novolog 70/30 since then. He was managed by his physician in [facility], moved to [place] 4/2023. He said he was not very compliant with his insulin back in [facility].   Last visit we made the following changes:  - increase Metformin  - stop mix insulin and start Glargine 40 units daily  - start Mounjaro with monthly titration  - personal CGM prescribed    He is very happy with personal cgm. He did loose wt on mounjaro yet but he had great BG control as below. He is worried about his kidney function, his GFR was down to 37 on last check 9/2023.     Previous A1C in records reviewed. A1C is 9.5 on 9/2023 down to 7.5 today  Weight is up 6 kg since moving from [facility].    Wt Readings from Last 4 Encounters:   12/13/23 95.7 kg (211 lb)   11/03/23 99.3 kg (219 lb)   09/13/23 99.3 kg (219 lb)          Diabetes Care/Complications:   Eyes: unsure, reported blurry vision.  Kidneys: Cr 1.84 ON 9/7/23, GFR 44 on 9/7/23. Gfr down to 37 on 9/13/23. Positive urine alb. On ARB.   Nerves: bilateral feet numbness. No ulcer or infections. No amputation. He is on lyrica 75 mg daily. Not controlling his symptoms. Dose increased to 150 mg last week and he already feels better.   Smoking: no  Blood Pressure: BPis controlled today. Meds losartan/Htcz 50-12.5 mg daily, metoprolol 100 mg daily,clonidine 0.1 mg bid   Lipids:  on 5/2023 on Crestor 10 mg daily  Macrovascular: no   Hypoglycemia: as above    Previous DM related Hospitalization:    Current treatment strategy:   Metformin 500 mg bid   Mounjaro 5 mg weekly  Glargine 40 unit(s) daily    Blood Glucose Monitoring:                                         Diet:   3 meals  Breakfast: bread, coffee and egg  Snack bread, coffee with sugar  Lunch: rice and meat  Dinner: rice and meat  No bedtime snack  Drinks: sometimes drinks juice and soda. Alcohol occasional.     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       Problem List     DM2  CKD stage 3b  HTN  Obesity class 2       Medications     Current Outpatient Medications   Medication    cloNIDine (CATAPRES) 0.1 MG tablet    Continuous Blood Gluc  (FREESTYLE KRISH 2 READER) ROCK    Continuous Blood Gluc Sensor (FREESTYLE KRISH 2 SENSOR) MISC    CONTOUR NEXT TEST test strip    Insulin Glargine-yfgn (SEMGLEE, YFGN,) 100 UNIT/ML SOLN    losartan-hydrochlorothiazide (HYZAAR) 50-12.5 MG tablet    metFORMIN (GLUCOPHAGE XR) 500 MG 24 hr tablet    metoprolol succinate ER (TOPROL XL) 100 MG 24 hr tablet    Microlet Lancets MISC    pregabalin (LYRICA) 150 MG capsule    rosuvastatin (CRESTOR) 10 MG tablet    tirzepatide (MOUNJARO) 5 MG/0.5ML pen    tirzepatide (MOUNJARO) 7.5 MG/0.5ML pen     No current facility-administered medications for this visit.        Allergies  "    No Known Allergies    Medical / Surgical History     DM2  CKD stage 3b  HTN  Obesity class 2       Social History     Social History     Socioeconomic History    Marital status:      Spouse name: Not on file    Number of children: Not on file    Years of education: Not on file    Highest education level: Not on file   Occupational History    Not on file   Tobacco Use    Smoking status: Never    Smokeless tobacco: Never   Substance and Sexual Activity    Alcohol use: Not on file    Drug use: Never    Sexual activity: Not on file   Other Topics Concern    Not on file   Social History Narrative    Not on file     Social Determinants of Health     Financial Resource Strain: Not on file   Food Insecurity: Not on file   Transportation Needs: Not on file   Physical Activity: Not on file   Stress: Not on file   Social Connections: Not on file   Interpersonal Safety: Not on file   Housing Stability: Not on file       Family History     No family history on file.    ROS     12 ROS completed, pertinent positive and negative in HPI    Physical Exam   /80   Pulse 90   Ht 1.626 m (5' 4\")   Wt 95.7 kg (211 lb)   SpO2 100%   BMI 36.22 kg/m       General: Comfortable, no obvious distress, normal body habitus  Eyes: Sclera anicteric, moist conjunctiva  HENT: Atraumatic, oropharynx clear, moist mucous membranes with no mucosal ulcerations  Neck: Trachea midline, supple.    CV: normal rate.   Resp:  good effort, no evidence of loud wheezing  Abdomen:  obese, non distended.   Skin: No rashes, lesions, or subcutaneous nodules on exposed skin.   Psych: Alert and oriented x 3. Appropriate affect, good insight  Extremities: No peripheral edema  Musculoskeletal: Appropriate muscle bulk and strength  Neuro: Moves all four extremities. No focal deficits on limited exam. Gait normal.     Labs/Imaging     Pertinent Labs were reviewed and updated in EPIC and discussed briefly.  Radiology Results were  reviewed and updated in " "EPIC.    Summary of recent findings:   No results found for: A1C    TSH   Date Value Ref Range Status   09/13/2023 1.82 0.30 - 4.20 uIU/mL Final       Creatinine   Date Value Ref Range Status   09/13/2023 2.18 (H) 0.67 - 1.17 mg/dL Final       No results for input(s): CHOL, HDL, LDL, TRIG, CHOLHDLRATIO in the last 44812 hours.    No results found for: \"EIMX75PGAFP\", \"[id number]\", \"[id number]\"    I personally reviewed the patient's outside records from [facility] EMR and Care Everywhere. Summary of pertinent findings in HPI.    Impression / Plan     1. Diabetes Mellitus: Type 2  2. Obesity class 2    Great control since last visit. I am concerned about his kidney function. We discussed going down on metformin back to once daily and continue titrating his mounjaro up.     Plan:  - decrease metformin to 500 mg daily.  - increase Mounjaro to 7.5 mg weekly  - decrease glargine to 35 unit(s) daily.  -follow-up with isis 1/2024 and Mahsa gay on 2/2023     2. Diabetes Complications: probably his CKD is due to DM. Urine alb positive on 9/2023. I think he might benefit from nephrology referral, encouraged him to discuss with PCP. No eye exam. Both ordered.  Neuropathy improving sx.     3. Blood Pressure Management: Blood pressure is  controlled. Currently is multiple pharmacotherapy for this.       4.Lipid Management: Per the new ACC/PETRA/NHLBI guidelines, statins are recommended for individuals with diabetes aged 40-75 with LDL  without ASCVD, and for any individual with ASCVD. Currently the patient is on a statin.      5. Smoking Status: Patient Pt is smoke free..     Review of the result(s) of each unique test - A1c and diabetes related labs.  40 minutes spent on the date of the encounter doing chart review, history and exam, documentation and further activities as noted above.  This time excludes time spent reviewing CGM.             Test and/or medications prescribed today:  Orders Placed This Encounter   Procedures "    AFINION HEMOGLOBIN A1C POCT         Follow up: 3 month      Jayson Castellano MD  Endocrinology, Diabetes and Metabolism  [facility]

## 2023-12-13 NOTE — PROGRESS NOTES
Endocrinology Clinic Visit      NAME:  Mayito Toth  PCP:  Bill Jayden PARKS  MRN:  [id number]  Reason for Consult:  uncontrolled DM2  Requesting Provider:  Referred Self    Chief Complaint     Chief Complaint   Patient presents with    Diabetes       History of Present Illness     Mayito Toth is a 52 year old male who is seen in clinic for uncontrolled DM2. Here with his wife. He established with me on 9/2023.    The patient was diagnosed with type 2 diabetes at age of early 40s, it was found on his annual blood tests. He was on metformin for few years then placed on insulin and has been on Novolog 70/30 since then. He was managed by his physician in [facility], moved to [place] 4/2023. He said he was not very compliant with his insulin back in [facility].   Last visit we made the following changes:  - increase Metformin  - stop mix insulin and start Glargine 40 units daily  - start Mounjaro with monthly titration  - personal CGM prescribed    He is very happy with personal cgm. He did loose wt on mounjaro yet but he had great BG control as below. He is worried about his kidney function, his GFR was down to 37 on last check 9/2023.     Previous A1C in records reviewed. A1C is 9.5 on 9/2023 down to 7.5 today  Weight is up 6 kg since moving from [facility].    Wt Readings from Last 4 Encounters:   12/13/23 95.7 kg (211 lb)   11/03/23 99.3 kg (219 lb)   09/13/23 99.3 kg (219 lb)         Diabetes Care/Complications:   Eyes: unsure, reported blurry vision.  Kidneys: Cr 1.84 ON 9/7/23, GFR 44 on 9/7/23. Gfr down to 37 on 9/13/23. Positive urine alb. On ARB.   Nerves: bilateral feet numbness. No ulcer or infections. No amputation. He is on lyrica 75 mg daily. Not controlling his symptoms. Dose increased to 150 mg last week and he already feels better.   Smoking: no  Blood Pressure: BPis controlled today. Meds losartan/Htcz 50-12.5 mg daily, metoprolol 100 mg daily,clonidine 0.1 mg bid   Lipids:  on 5/2023 on  Crestor 10 mg daily  Macrovascular: no   Hypoglycemia: as above    Previous DM related Hospitalization:    Current treatment strategy:   Metformin 500 mg bid   Mounjaro 5 mg weekly  Glargine 40 unit(s) daily    Blood Glucose Monitoring:                                         Diet:   3 meals  Breakfast: bread, coffee and egg  Snack bread, coffee with sugar  Lunch: rice and meat  Dinner: rice and meat  No bedtime snack  Drinks: sometimes drinks juice and soda. Alcohol occasional.     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       Problem List     DM2  CKD stage 3b  HTN  Obesity class 2       Medications     Current Outpatient Medications   Medication    cloNIDine (CATAPRES) 0.1 MG tablet    Continuous Blood Gluc  (FREESTYLE KRISH 2 READER) ROCK    Continuous Blood Gluc Sensor (FREESTYLE KRISH 2 SENSOR) MISC    CONTOUR NEXT TEST test strip    Insulin Glargine-yfgn (SEMGLEE, YFGN,) 100 UNIT/ML SOLN    losartan-hydrochlorothiazide (HYZAAR) 50-12.5 MG tablet    metFORMIN (GLUCOPHAGE XR) 500 MG 24 hr tablet    metoprolol succinate ER (TOPROL XL) 100 MG 24 hr tablet    Microlet Lancets MISC    pregabalin (LYRICA) 150 MG capsule    rosuvastatin (CRESTOR) 10 MG tablet    tirzepatide (MOUNJARO) 5 MG/0.5ML pen    tirzepatide (MOUNJARO) 7.5 MG/0.5ML pen     No current facility-administered medications for this visit.        Allergies     No Known Allergies    Medical / Surgical History     DM2  CKD stage 3b  HTN  Obesity class 2       Social History     Social History     Socioeconomic History    Marital status:      Spouse name: Not on file    Number of children: Not on file    Years of education: Not on file    Highest education level: Not on file   Occupational History    Not on file   Tobacco Use    Smoking status: Never    Smokeless tobacco: Never   Substance and Sexual Activity    Alcohol use: Not on file    Drug use: Never    Sexual activity: Not on file  "  Other Topics Concern    Not on file   Social History Narrative    Not on file     Social Determinants of Health     Financial Resource Strain: Not on file   Food Insecurity: Not on file   Transportation Needs: Not on file   Physical Activity: Not on file   Stress: Not on file   Social Connections: Not on file   Interpersonal Safety: Not on file   Housing Stability: Not on file       Family History     No family history on file.    ROS     12 ROS completed, pertinent positive and negative in HPI    Physical Exam   /80   Pulse 90   Ht 1.626 m (5' 4\")   Wt 95.7 kg (211 lb)   SpO2 100%   BMI 36.22 kg/m       General: Comfortable, no obvious distress, normal body habitus  Eyes: Sclera anicteric, moist conjunctiva  HENT: Atraumatic, oropharynx clear, moist mucous membranes with no mucosal ulcerations  Neck: Trachea midline, supple.    CV: normal rate.   Resp:  good effort, no evidence of loud wheezing  Abdomen:  obese, non distended.   Skin: No rashes, lesions, or subcutaneous nodules on exposed skin.   Psych: Alert and oriented x 3. Appropriate affect, good insight  Extremities: No peripheral edema  Musculoskeletal: Appropriate muscle bulk and strength  Neuro: Moves all four extremities. No focal deficits on limited exam. Gait normal.     Labs/Imaging     Pertinent Labs were reviewed and updated in EPIC and discussed briefly.  Radiology Results were  reviewed and updated in EPIC.    Summary of recent findings:   No results found for: A1C    TSH   Date Value Ref Range Status   09/13/2023 1.82 0.30 - 4.20 uIU/mL Final       Creatinine   Date Value Ref Range Status   09/13/2023 2.18 (H) 0.67 - 1.17 mg/dL Final       No results for input(s): CHOL, HDL, LDL, TRIG, CHOLHDLRATIO in the last 11014 hours.    No results found for: \"PUIT34OKYAK\", \"[id number]\", \"[id number]\"    I personally reviewed the patient's outside records from [facility] EMR and Care Everywhere. Summary of pertinent findings in HPI.    Impression / Plan "     1. Diabetes Mellitus: Type 2  2. Obesity class 2    Great control since last visit. I am concerned about his kidney function. We discussed going down on metformin back to once daily and continue titrating his mounjaro up.     Plan:  - decrease metformin to 500 mg daily.  - increase Mounjaro to 7.5 mg weekly  - decrease glargine to 35 unit(s) daily.  -follow-up with isis 1/2024 and Mahsa gay on 2/2023     2. Diabetes Complications: probably his CKD is due to DM. Urine alb positive on 9/2023. I think he might benefit from nephrology referral, encouraged him to discuss with PCP. No eye exam. Both ordered.  Neuropathy improving sx.     3. Blood Pressure Management: Blood pressure is  controlled. Currently is multiple pharmacotherapy for this.       4.Lipid Management: Per the new ACC/PETRA/NHLBI guidelines, statins are recommended for individuals with diabetes aged 40-75 with LDL  without ASCVD, and for any individual with ASCVD. Currently the patient is on a statin.      5. Smoking Status: Patient Pt is smoke free..     Review of the result(s) of each unique test - A1c and diabetes related labs.  40 minutes spent on the date of the encounter doing chart review, history and exam, documentation and further activities as noted above.  This time excludes time spent reviewing CGM.             Test and/or medications prescribed today:  Orders Placed This Encounter   Procedures    AFINION HEMOGLOBIN A1C POCT         Follow up: 3 month      Jayson Castellano MD  Endocrinology, Diabetes and Metabolism  [facility]

## 2023-12-26 DIAGNOSIS — E11.9 TYPE 2 DIABETES MELLITUS (H): ICD-10-CM

## 2023-12-26 DIAGNOSIS — E11.42 TYPE 2 DIABETES MELLITUS WITH DIABETIC POLYNEUROPATHY, WITH LONG-TERM CURRENT USE OF INSULIN (H): ICD-10-CM

## 2023-12-26 DIAGNOSIS — Z79.4 TYPE 2 DIABETES MELLITUS WITH DIABETIC POLYNEUROPATHY, WITH LONG-TERM CURRENT USE OF INSULIN (H): ICD-10-CM

## 2023-12-26 RX ORDER — TIRZEPATIDE 7.5 MG/.5ML
7.5 INJECTION, SOLUTION SUBCUTANEOUS
Qty: 6 ML | Refills: 3 | Status: SHIPPED | OUTPATIENT
Start: 2023-12-26 | End: 2023-12-27

## 2023-12-26 RX ORDER — INSULIN GLARGINE-YFGN 100 [IU]/ML
40 INJECTION, SOLUTION SUBCUTANEOUS DAILY
Qty: 60 ML | Refills: 3 | Status: SHIPPED | OUTPATIENT
Start: 2023-12-26 | End: 2024-03-27

## 2023-12-27 ENCOUNTER — TELEPHONE (OUTPATIENT)
Dept: PHARMACY | Facility: CLINIC | Age: 53
End: 2023-12-27

## 2023-12-27 DIAGNOSIS — E11.9 TYPE 2 DIABETES MELLITUS (H): ICD-10-CM

## 2023-12-27 RX ORDER — LOSARTAN POTASSIUM AND HYDROCHLOROTHIAZIDE 12.5; 5 MG/1; MG/1
1 TABLET ORAL DAILY
Qty: 90 TABLET | Refills: 1 | Status: SHIPPED | OUTPATIENT
Start: 2023-12-27 | End: 2024-03-27

## 2023-12-27 RX ORDER — TIRZEPATIDE 7.5 MG/.5ML
7.5 INJECTION, SOLUTION SUBCUTANEOUS
Qty: 6 ML | Refills: 3 | Status: SHIPPED | OUTPATIENT
Start: 2023-12-27 | End: 2024-03-27

## 2023-12-27 RX ORDER — METFORMIN HCL 500 MG
500 TABLET, EXTENDED RELEASE 24 HR ORAL
Qty: 90 TABLET | Refills: 3 | Status: SHIPPED | OUTPATIENT
Start: 2023-12-27

## 2023-12-27 NOTE — ADDENDUM NOTE
Addended by: GERALDO CAM on: 12/27/2023 01:12 PM     Modules accepted: Orders     Nursing Note by Tony Kellogg at 03/13/18 09:40 AM     Author:  Tony Kellogg Service:  (none) Author Type:  Medical Records Staff     Filed:  03/13/18 09:41 AM Encounter Date:  3/12/2018 Status:  Signed     :  Tony Kellogg (Medical Records Staff)            Date Form Received by Disability:[KK1.1T] 3/ 12/ 2018[KK1.1M]   Authorization? [KK1.1T] yes[KK1.1M]   Date sent for auth:    Date auth returned:    Charge:[KK1.1T] no[KK1.1M]    Type of PUFU:[MZ4.6I] Lic.  Placard/Medical report[KK1.1M]    Company:[KK1.1T] [employer][KK1.1M]   When form complete:[KK1.1T] Patient to  at  [address] [place] [address]    Comments[KK1.1T]          Revision History        User Key Date/Time User Provider Type Action    > KK1.1 03/13/18 09:41 AM Tony Kellogg Medical Records Staff Sign    M - Manual, T - Template

## 2023-12-27 NOTE — TELEPHONE ENCOUNTER
Patient's wife called my line requesting 3-month supply for insulin and Mounjaro as their trip to the [facility] has been pushed up to beginning of January.  Sent prescription for 3-month supply of Mounjaro and insulin to his pharmacy so he could pick this up.  Will touch base with patient on further titration when he returns.    Bob Chung, PharmD, [facility]  Endocrine & Diabetes [facility] Pharmacist  [address] [place] [id number]

## 2024-03-27 ENCOUNTER — OFFICE VISIT (OUTPATIENT)
Dept: ENDOCRINOLOGY | Facility: CLINIC | Age: 54
End: 2024-03-27
Payer: COMMERCIAL

## 2024-03-27 VITALS
HEART RATE: 81 BPM | OXYGEN SATURATION: 99 % | DIASTOLIC BLOOD PRESSURE: 96 MMHG | SYSTOLIC BLOOD PRESSURE: 155 MMHG | BODY MASS INDEX: 36.93 KG/M2 | WEIGHT: 215.17 LBS

## 2024-03-27 DIAGNOSIS — E11.42 TYPE 2 DIABETES MELLITUS WITH DIABETIC POLYNEUROPATHY, WITH LONG-TERM CURRENT USE OF INSULIN (H): ICD-10-CM

## 2024-03-27 DIAGNOSIS — Z79.4 TYPE 2 DIABETES MELLITUS WITH DIABETIC POLYNEUROPATHY, WITH LONG-TERM CURRENT USE OF INSULIN (H): ICD-10-CM

## 2024-03-27 LAB — HBA1C MFR BLD: 7.1 %

## 2024-03-27 PROCEDURE — 83036 HEMOGLOBIN GLYCOSYLATED A1C: CPT | Performed by: PATHOLOGY

## 2024-03-27 PROCEDURE — 99215 OFFICE O/P EST HI 40 MIN: CPT | Performed by: STUDENT IN AN ORGANIZED HEALTH CARE EDUCATION/TRAINING PROGRAM

## 2024-03-27 PROCEDURE — G2211 COMPLEX E/M VISIT ADD ON: HCPCS | Performed by: STUDENT IN AN ORGANIZED HEALTH CARE EDUCATION/TRAINING PROGRAM

## 2024-03-27 RX ORDER — CLONIDINE HYDROCHLORIDE 0.1 MG/1
0.1 TABLET ORAL 2 TIMES DAILY
Qty: 120 TABLET | Refills: 1 | Status: SHIPPED | OUTPATIENT
Start: 2024-03-27

## 2024-03-27 RX ORDER — INSULIN GLARGINE-YFGN 100 [IU]/ML
30 INJECTION, SOLUTION SUBCUTANEOUS DAILY
Status: SHIPPED
Start: 2024-03-27

## 2024-03-27 RX ORDER — PREGABALIN 150 MG/1
150 CAPSULE ORAL DAILY
Qty: 90 CAPSULE | Refills: 3 | Status: SHIPPED | OUTPATIENT
Start: 2024-03-27

## 2024-03-27 RX ORDER — TIRZEPATIDE 10 MG/.5ML
10 INJECTION, SOLUTION SUBCUTANEOUS
Qty: 2 ML | Refills: 1 | Status: SHIPPED | OUTPATIENT
Start: 2024-03-27

## 2024-03-27 RX ORDER — LOSARTAN POTASSIUM AND HYDROCHLOROTHIAZIDE 12.5; 5 MG/1; MG/1
1 TABLET ORAL DAILY
Qty: 90 TABLET | Refills: 1 | Status: SHIPPED | OUTPATIENT
Start: 2024-03-27 | End: 2024-09-22

## 2024-03-27 ASSESSMENT — PAIN SCALES - GENERAL: PAINLEVEL: NO PAIN (1)

## 2024-03-27 NOTE — LETTER
3/27/2024       RE: Mayito Toth  [address]  [place] [address]     Dear Colleague,    Thank you for referring your patient, Mayito Toth, to the [facility] ENDOCRINOLOG[facility] [place] at [facility]. Please see a copy of my visit note below.    Patient is showing 4/5 [place]CM met. A1c not in range   ETIENNE Kendall                      Endocrinology Clinic Visit      NAME:  Mayito Toth  PCP:  Jayden Khan  MRN:  [id number]  Reason for Consult:  uncontrolled DM2  Requesting Provider:  Referred Self    Chief Complaint     Chief Complaint   Patient presents with    Diabetes     Freestyle Martin sensors, Losartan 12.5 MG, Clonidine 1 MG, Pregabalin 150 MG. Patient says his Freestyle Martin 2 reader and sensor malfunctioned while on vacation so would like a new reader and sensors.       History of Present Illness     Mayito Toth is a 52 year old male who is seen in clinic for uncontrolled DM2. Here with his wife. He established with me on 12/2023.    The patient was diagnosed with type 2 diabetes at age of early 40s, it was found on his annual blood tests. He was on metformin for few years then placed on insulin Novolog 70/30 . He was managed by his physician in [facility], moved to [place] 4/2023. He said he was not very compliant with his insulin back in [facility].   9/2023 visit we made the following changes:  - increase Metformin  - stop mix insulin and start Glargine 40 units daily  - start Mounjaro with monthly titration  - personal CGM prescribed    12/2023 visit: He is very happy with personal cgm. He did not loose wt on mounjaro yet but he had great BG control as below. He is worried about his kidney function, his GFR was down to 37 on last check 9/2023.       Since last visit he has been doing well, tolerating Mounjaro well, some reflux but not bothering him much. We decreased his metformin to 500 mg daily given his GFR.  He  went to [facility] January until 3/20/24, eating more rice.  Wt is up, 4 lbs since last visit.  He ran out of sensors since 2/20/24, no BG checks , no sx of hypoglycemia.  We reviewed his labs 12/2023 gfr 48.      Previous A1C in records reviewed. A1C is 9.5 on 9/2023 down to 7.1 today  Weight is up 6 kg since moving from [facility].    Wt Readings from Last 4 Encounters:   03/27/24 97.6 kg (215 lb 2.7 oz)   12/13/23 95.7 kg (211 lb)   11/03/23 99.3 kg (219 lb)   09/13/23 99.3 kg (219 lb)         Diabetes Care/Complications:   Eyes: unsure, reported blurry vision.  Kidneys: Cr 1.84 ON 9/7/23, GFR 44 on 9/7/23. Gfr down to 37 on 9/13/23. Positive urine alb. On ARB.   Nerves: bilateral feet numbness. No ulcer or infections. No amputation. He is on lyrica 75 mg daily. Not controlling his symptoms. Dose increased to 150 mg 12/2023 and he already feels better.   Smoking: no  Blood Pressure: BPis controlled today. Meds losartan/Htcz 50-12.5 mg daily, metoprolol 100 mg daily,clonidine 0.1 mg bid   Lipids: LDL 39 on 9/2023 on Crestor 10 mg daily  Macrovascular: no   Hypoglycemia: as above    Previous DM related Hospitalization:    Current treatment strategy:   Metformin 500 mg daily  Mounjaro 7.5 mg weekly for the past 3 month  Glargine 35 unit(s) daily    Blood Glucose Monitoring:     Patient is showing 4/5 [place]CM met. A1c not in range   Ella Gonzalez, ETIENNE                          Diet:   3 meals  Breakfast: bread, coffee and egg  Snack bread, coffee with sugar  Lunch: rice and meat  Dinner: rice and meat  No bedtime snack  Drinks: sometimes drinks juice and soda. Alcohol occasional.     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       Problem List     DM2  CKD stage 3b  HTN  Obesity class 2       Medications     Current Outpatient Medications   Medication    cloNIDine (CATAPRES) 0.1 MG tablet    Continuous Blood Gluc  (FREESTYLE KRISH 2 READER) ROCK    Continuous  Blood Gluc Sensor (FREESTYLE KRISH 2 SENSOR) [facility]    CONTOUR NEXT TEST test strip    Insulin Glargine-yfgn (SEMGLEE, YFGN,) 100 UNIT/ML SOPN    losartan-hydrochlorothiazide (HYZAAR) 50-12.5 MG tablet    metFORMIN (GLUCOPHAGE XR) 500 MG 24 hr tablet    metoprolol succinate ER (TOPROL XL) 100 MG 24 hr tablet    Microlet Lancets MISC    pregabalin (LYRICA) 150 MG capsule    rosuvastatin (CRESTOR) 10 MG tablet    tirzepatide (MOUNJARO) 7.5 MG/0.5ML pen     No current facility-administered medications for this visit.        Allergies     No Known Allergies    Medical / Surgical History     DM2  CKD stage 3b  HTN  Obesity class 2       Social History     Social History     Socioeconomic History    Marital status:      Spouse name: Not on file    Number of children: Not on file    Years of education: Not on file    Highest education level: Not on file   Occupational History    Not on file   Tobacco Use    Smoking status: Never    Smokeless tobacco: Never   Substance and Sexual Activity    Alcohol use: Not on file    Drug use: Never    Sexual activity: Not on file   Other Topics Concern    Not on file   Social History Narrative    Not on file     Social Determinants of Health     Financial Resource Strain: Not on file   Food Insecurity: Not on file   Transportation Needs: Not on file   Physical Activity: Not on file   Stress: Not on file   Social Connections: Not on file   Interpersonal Safety: Not on file   Housing Stability: Not on file       Family History     No family history on file.    ROS     12 ROS completed, pertinent positive and negative in HPI    Physical Exam   BP (!) 155/96 (BP Location: Right arm)   Pulse 81   Wt 97.6 kg (215 lb 2.7 oz)   SpO2 99%   BMI 36.93 kg/m       General: Comfortable, no obvious distress, normal body habitus  Eyes: Sclera anicteric, moist conjunctiva  HENT: Atraumatic, oropharynx clear, moist mucous membranes with no mucosal ulcerations  Neck: Trachea midline, supple.   "  CV: normal rate.   Resp:  good effort, no evidence of loud wheezing  Abdomen:  obese, non distended.   Skin: No rashes, lesions, or subcutaneous nodules on exposed skin.   Psych: Alert and oriented x 3. Appropriate affect, good insight  Extremities: No peripheral edema  Musculoskeletal: Appropriate muscle bulk and strength  Neuro: Moves all four extremities. No focal deficits on limited exam. Gait normal.     Diabetic Foot Screen:  Any complaints of increased pain or numbness ? No  Is there a foot ulcer now or a history of foot ulcer?  YES multiple small healed ulcers.  Does the foot have an abnormal shape? No  Are the nails thick, too long or ingrown?  YES thick  Are there any redness or open areas? No         Sensation Testing done at all points on the diagram with monofilament     Right Foot: Sensation Normal at all points, Absent at the following points , 1, 2, 3, 4, 5, and 6  Left Foot: Sensation Normal at all points, Absent at the following points , 1, 2, 3, 4, 5, and 6     Risk Category: 2- Loss of protective sensation with pre-ulcer or callous but no ulceration  Performed by Jayson Castellano MD      Labs/Imaging     Pertinent Labs were reviewed and updated in EPIC and discussed briefly.  Radiology Results were  reviewed and updated in EPIC.    Summary of recent findings:   No results found for: A1C    TSH   Date Value Ref Range Status   09/13/2023 1.82 0.30 - 4.20 uIU/mL Final       Creatinine   Date Value Ref Range Status   12/13/2023 1.72 (H) 0.67 - 1.17 mg/dL Final       No results for input(s): CHOL, HDL, LDL, TRIG, CHOLHDLRATIO in the last 64625 hours.    No results found for: \"OSLH29CKBTE\", \"[id number]\", \"[id number]\"    I personally reviewed the patient's outside records from [facility] EMR and Care Everywhere. Summary of pertinent findings in HPI.    Impression / Plan     1. Diabetes Mellitus: Type 2  2. Obesity class 2    Great control since starting mounjaro. We discussed increasing mounjaro to 10 mg to " help with both bg control and wt loss. He has mild side effect on 7.5 mg including gerd and bloating. He would like to proceed to up titrating mounjaro     Plan:  - continue metformin  500 mg daily.  - increase Mounjaro to 10 mg weekly  - decrease glargine to 30 unit(s) daily.       2. Diabetes Complications: probably his CKD is due to DM. Urine alb positive on 9/2023. I think he might benefit from nephrology referral, encouraged him to discuss with PCP. No eye exam. Both ordered.  Neuropathy improving sx.     3. Blood Pressure Management: Blood pressure is  controlled. Currently is multiple pharmacotherapy for this.       4.Lipid Management: Per the new ACC/PETRA/NHLBI guidelines, statins are recommended for individuals with diabetes aged 40-75 with LDL  without ASCVD, and for any individual with ASCVD. Currently the patient is on a statin.      5. Smoking Status: Patient Pt is smoke free..     Review of the result(s) of each unique test - A1c and diabetes related labs.  40 minutes spent on the date of the encounter doing chart review, history and exam, documentation and further activities as noted above.  This time excludes time spent reviewing CGM.       Test and/or medications prescribed today:  Orders Placed This Encounter   Procedures    AFINION HEMOGLOBIN A1C POCT       Follow up: 3 month    The longitudinal plan of care for the diagnosis(es)/condition(s) as documented were addressed during this visit. Due to the added complexity in care, I will continue to support Mayito in the subsequent management and with ongoing continuity of care.      Jayson Castellano MD  Endocrinology, Diabetes and Metabolism  [facility]

## 2024-03-27 NOTE — PROGRESS NOTES
Endocrinology Clinic Visit      NAME:  Mayito Toth  PCP:  Bill Jayden PARKS  MRN:  [id number]  Reason for Consult:  uncontrolled DM2  Requesting Provider:  Referred Self    Chief Complaint     Chief Complaint   Patient presents with    Diabetes     Freestyle Martin sensors, Losartan 12.5 MG, Clonidine 1 MG, Pregabalin 150 MG. Patient says his Freestyle Martin 2 reader and sensor malfunctioned while on vacation so would like a new reader and sensors.       History of Present Illness     Mayito Toth is a 52 year old male who is seen in clinic for uncontrolled DM2. Here with his wife. He established with me on 12/2023.    The patient was diagnosed with type 2 diabetes at age of early 40s, it was found on his annual blood tests. He was on metformin for few years then placed on insulin Novolog 70/30 . He was managed by his physician in [facility], moved to [place] 4/2023. He said he was not very compliant with his insulin back in [facility].   9/2023 visit we made the following changes:  - increase Metformin  - stop mix insulin and start Glargine 40 units daily  - start Mounjaro with monthly titration  - personal CGM prescribed    12/2023 visit: He is very happy with personal cgm. He did not loose wt on mounjaro yet but he had great BG control as below. He is worried about his kidney function, his GFR was down to 37 on last check 9/2023.       Since last visit he has been doing well, tolerating Mounjaro well, some reflux but not bothering him much. We decreased his metformin to 500 mg daily given his GFR.  He went to [facility] January until 3/20/24, eating more rice.  Wt is up, 4 lbs since last visit.  He ran out of sensors since 2/20/24, no BG checks , no sx of hypoglycemia.  We reviewed his labs 12/2023 gfr 48.      Previous A1C in records reviewed. A1C is 9.5 on 9/2023 down to 7.1 today  Weight is up 6 kg since moving from [facility].    Wt Readings from Last 4 Encounters:   03/27/24 97.6 kg (215 lb 2.7 oz)    12/13/23 95.7 kg (211 lb)   11/03/23 99.3 kg (219 lb)   09/13/23 99.3 kg (219 lb)         Diabetes Care/Complications:   Eyes: unsure, reported blurry vision.  Kidneys: Cr 1.84 ON 9/7/23, GFR 44 on 9/7/23. Gfr down to 37 on 9/13/23. Positive urine alb. On ARB.   Nerves: bilateral feet numbness. No ulcer or infections. No amputation. He is on lyrica 75 mg daily. Not controlling his symptoms. Dose increased to 150 mg 12/2023 and he already feels better.   Smoking: no  Blood Pressure: BPis controlled today. Meds losartan/Htcz 50-12.5 mg daily, metoprolol 100 mg daily,clonidine 0.1 mg bid   Lipids: LDL 39 on 9/2023 on Crestor 10 mg daily  Macrovascular: no   Hypoglycemia: as above    Previous DM related Hospitalization:    Current treatment strategy:   Metformin 500 mg daily  Mounjaro 7.5 mg weekly for the past 3 month  Glargine 35 unit(s) daily    Blood Glucose Monitoring:     Patient is showing 4/5 MNCM met. A1c not in range   ETIENNE Kendall                          Diet:   3 meals  Breakfast: bread, coffee and egg  Snack bread, coffee with sugar  Lunch: rice and meat  Dinner: rice and meat  No bedtime snack  Drinks: sometimes drinks juice and soda. Alcohol occasional.     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       Problem List     DM2  CKD stage 3b  HTN  Obesity class 2       Medications     Current Outpatient Medications   Medication    cloNIDine (CATAPRES) 0.1 MG tablet    Continuous Blood Gluc  (FREESTYLE KRISH 2 READER) ROCK    Continuous Blood Gluc Sensor (FREESTYLE KRISH 2 SENSOR) MISC    CONTOUR NEXT TEST test strip    Insulin Glargine-yfgn (SEMGLEE, YFGN,) 100 UNIT/ML SOPN    losartan-hydrochlorothiazide (HYZAAR) 50-12.5 MG tablet    metFORMIN (GLUCOPHAGE XR) 500 MG 24 hr tablet    metoprolol succinate ER (TOPROL XL) 100 MG 24 hr tablet    Microlet Lancets MISC    pregabalin (LYRICA) 150 MG capsule    rosuvastatin (CRESTOR) 10 MG tablet     tirzepatide (MOUNJARO) 7.5 MG/0.5ML pen     No current facility-administered medications for this visit.        Allergies     No Known Allergies    Medical / Surgical History     DM2  CKD stage 3b  HTN  Obesity class 2       Social History     Social History     Socioeconomic History    Marital status:      Spouse name: Not on file    Number of children: Not on file    Years of education: Not on file    Highest education level: Not on file   Occupational History    Not on file   Tobacco Use    Smoking status: Never    Smokeless tobacco: Never   Substance and Sexual Activity    Alcohol use: Not on file    Drug use: Never    Sexual activity: Not on file   Other Topics Concern    Not on file   Social History Narrative    Not on file     Social Determinants of Health     Financial Resource Strain: Not on file   Food Insecurity: Not on file   Transportation Needs: Not on file   Physical Activity: Not on file   Stress: Not on file   Social Connections: Not on file   Interpersonal Safety: Not on file   Housing Stability: Not on file       Family History     No family history on file.    ROS     12 ROS completed, pertinent positive and negative in HPI    Physical Exam   BP (!) 155/96 (BP Location: Right arm)   Pulse 81   Wt 97.6 kg (215 lb 2.7 oz)   SpO2 99%   BMI 36.93 kg/m       General: Comfortable, no obvious distress, normal body habitus  Eyes: Sclera anicteric, moist conjunctiva  HENT: Atraumatic, oropharynx clear, moist mucous membranes with no mucosal ulcerations  Neck: Trachea midline, supple.    CV: normal rate.   Resp:  good effort, no evidence of loud wheezing  Abdomen:  obese, non distended.   Skin: No rashes, lesions, or subcutaneous nodules on exposed skin.   Psych: Alert and oriented x 3. Appropriate affect, good insight  Extremities: No peripheral edema  Musculoskeletal: Appropriate muscle bulk and strength  Neuro: Moves all four extremities. No focal deficits on limited exam. Gait normal.  "    Diabetic Foot Screen:  Any complaints of increased pain or numbness ? No  Is there a foot ulcer now or a history of foot ulcer?  YES multiple small healed ulcers.  Does the foot have an abnormal shape? No  Are the nails thick, too long or ingrown?  YES thick  Are there any redness or open areas? No         Sensation Testing done at all points on the diagram with monofilament     Right Foot: Sensation Normal at all points, Absent at the following points , 1, 2, 3, 4, 5, and 6  Left Foot: Sensation Normal at all points, Absent at the following points , 1, 2, 3, 4, 5, and 6     Risk Category: 2- Loss of protective sensation with pre-ulcer or callous but no ulceration  Performed by Jayson Castellano MD      Labs/Imaging     Pertinent Labs were reviewed and updated in EPIC and discussed briefly.  Radiology Results were  reviewed and updated in EPIC.    Summary of recent findings:   No results found for: A1C    TSH   Date Value Ref Range Status   09/13/2023 1.82 0.30 - 4.20 uIU/mL Final       Creatinine   Date Value Ref Range Status   12/13/2023 1.72 (H) 0.67 - 1.17 mg/dL Final       No results for input(s): CHOL, HDL, LDL, TRIG, CHOLHDLRATIO in the last 87073 hours.    No results found for: \"NPHI31FLUCH\", \"[id number]\", \"[id number]\"    I personally reviewed the patient's outside records from [facility] EMR and Care Everywhere. Summary of pertinent findings in [facility].    Impression / Plan     1. Diabetes Mellitus: Type 2  2. Obesity class 2    Great control since starting mounjaro. We discussed increasing mounjaro to 10 mg to help with both bg control and wt loss. He has mild side effect on 7.5 mg including gerd and bloating. He would like to proceed to up titrating mounjaro     Plan:  - continue metformin  500 mg daily.  - increase Mounjaro to 10 mg weekly  - decrease glargine to 30 unit(s) daily.       2. Diabetes Complications: probably his CKD is due to DM. Urine alb positive on 9/2023. I think he might benefit from " nephrology referral, encouraged him to discuss with PCP. No eye exam. Both ordered.  Neuropathy improving sx.     3. Blood Pressure Management: Blood pressure is  controlled. Currently is multiple pharmacotherapy for this.       4.Lipid Management: Per the new ACC/PETRA/NHLBI guidelines, statins are recommended for individuals with diabetes aged 40-75 with LDL  without ASCVD, and for any individual with ASCVD. Currently the patient is on a statin.      5. Smoking Status: Patient Pt is smoke free..     Review of the result(s) of each unique test - A1c and diabetes related labs.  40 minutes spent on the date of the encounter doing chart review, history and exam, documentation and further activities as noted above.  This time excludes time spent reviewing CGM.             Test and/or medications prescribed today:  Orders Placed This Encounter   Procedures    AFINION HEMOGLOBIN A1C POCT         Follow up: 3 month    The longitudinal plan of care for the diagnosis(es)/condition(s) as documented were addressed during this visit. Due to the added complexity in care, I will continue to support Mayito in the subsequent management and with ongoing continuity of care.      Jayson Castellano MD  Endocrinology, Diabetes and Metabolism  [facility]

## 2024-04-01 ENCOUNTER — TELEPHONE (OUTPATIENT)
Dept: ENDOCRINOLOGY | Facility: CLINIC | Age: 54
End: 2024-04-01
Payer: COMMERCIAL

## 2024-04-01 DIAGNOSIS — E11.42 TYPE 2 DIABETES MELLITUS WITH DIABETIC POLYNEUROPATHY, WITH LONG-TERM CURRENT USE OF INSULIN (H): ICD-10-CM

## 2024-04-01 DIAGNOSIS — Z79.4 TYPE 2 DIABETES MELLITUS WITH DIABETIC POLYNEUROPATHY, WITH LONG-TERM CURRENT USE OF INSULIN (H): ICD-10-CM

## 2024-04-01 NOTE — TELEPHONE ENCOUNTER
[facility] Call Center    Phone Message    May a detailed message be left on voicemail: yes     Reason for Call: Continuous Blood Gluc Sensor (FREESTYLE KRISH 2 SENSOR) MISC was sent to [employer] mail delivery on 3/27/24, medication needs to be sent to   [facility] [address] [place] [address] [id number]      Per patient wife. Thank you    Action Taken: Message routed to:  [facility] ([facility]):      Travel Screening: Not Applicable

## 2024-04-01 NOTE — TELEPHONE ENCOUNTER
Continuous Blood Gluc Sensor (FREESTYLE KRISH 2 SENSOR) MIS   Disp-2 kit, R-5,     Remaining refills sent to requested pharmacy. Per WO per pt call request

## 2024-06-12 ENCOUNTER — TELEPHONE (OUTPATIENT)
Dept: ENDOCRINOLOGY | Facility: CLINIC | Age: 54
End: 2024-06-12
Payer: COMMERCIAL

## 2024-06-12 NOTE — TELEPHONE ENCOUNTER
Left Voicemail (1st Attempt) and Sent Mychart (1st Attempt) for the patient to call back and schedule the following:    Appointment type: Return diabetes  Provider: Gray  Return date: Reschedule from 7/10  Specialty phone number: [phone]  Additional appointment(s) needed: NA  Additonal Notes: Reschedule from 7/10. Provider did open up make up time for these reschedules though on 7/12 (in person) and 7/19 (Virtual) these will require manual scheduling.

## 2024-06-14 NOTE — TELEPHONE ENCOUNTER
Left Voicemail (2nd Attempt) for the patient to call back and schedule the following:    Appointment type: Return diabetes  Provider: Gray  Return date: Reschedule from 7/10  Specialty phone number: [phone]  Additional appointment(s) needed: NA  Additonal Notes: (final attempt; appt cancelled, max attempts reached to contact pt)     Reschedule from 7/10. Provider did open up make up time for these reschedules though on 7/12 (in person) and 7/19 (Virtual) these will require manual scheduling.     Please note that the above appointment(s) will require manual scheduling as they are marked as BERNARDA and will not appear using auto search. Do not schedule the patient if another patient has already been scheduled in the requested appointment slot.       Loretta Isaac on 6/14/2024 at 2:31 PM

## 2024-07-28 ENCOUNTER — HEALTH MAINTENANCE LETTER (OUTPATIENT)
Age: 54
End: 2024-07-28

## 2024-09-22 DIAGNOSIS — Z79.4 TYPE 2 DIABETES MELLITUS WITH DIABETIC POLYNEUROPATHY, WITH LONG-TERM CURRENT USE OF INSULIN (H): ICD-10-CM

## 2024-09-22 DIAGNOSIS — E11.42 TYPE 2 DIABETES MELLITUS WITH DIABETIC POLYNEUROPATHY, WITH LONG-TERM CURRENT USE OF INSULIN (H): ICD-10-CM

## 2024-09-26 RX ORDER — LOSARTAN POTASSIUM AND HYDROCHLOROTHIAZIDE 12.5; 5 MG/1; MG/1
1 TABLET ORAL DAILY
Qty: 90 TABLET | Refills: 0 | Status: SHIPPED | OUTPATIENT
Start: 2024-09-26

## 2024-09-26 NOTE — TELEPHONE ENCOUNTER
LCV:3/27/2024  [facility] [place]  ABN GFR- reviewed by provider  last clinic note:  3. Blood Pressure Management: Blood pressure is  controlled. Currently is multiple pharmacotherapy for this.    RF 90 day  FYI to scheduling

## 2024-09-27 ENCOUNTER — TELEPHONE (OUTPATIENT)
Dept: ENDOCRINOLOGY | Facility: CLINIC | Age: 54
End: 2024-09-27
Payer: COMMERCIAL

## 2024-09-27 NOTE — TELEPHONE ENCOUNTER
Patient Contacted for the patient to call back and schedule the following:    Appointment type: return diabetes   Provider: Gray or any other provider at [facility] if pt does not want to transfer care to [facility]  Return date: next avail  Specialty phone number: [phone]   Additional appointment(s) needed:   Additonal Notes: Spoke to pt spouse, she stated she will talk to pt when she gets off work they are going on vacation to [place] this weekend and [place] the [place]. Will call back to schedule at their convenience when available.   Pt of Dr. Castellano, past due for appt- diabetes   Please call to schedule.     Thanks   Have a good day,   Evette RN,   [facility] Refill Team       I do not need any follow up on the scheduling of this appointment unless the patient will no longer be receiving care in our clinic.     *Provider is moving to  only starting on 10/1. Make sure you have selected combined departments when scheduling to pull up the providers schedule at all locations if pt does want to transfer care with her in . Thank you.*    Greta Peres on 9/27/2024 at 10:39 AM

## 2024-10-17 ENCOUNTER — TELEPHONE (OUTPATIENT)
Dept: ENDOCRINOLOGY | Facility: CLINIC | Age: 54
End: 2024-10-17
Payer: COMMERCIAL

## 2024-10-17 NOTE — TELEPHONE ENCOUNTER
Prior Authorization Not Needed per Insurance    Medication: MOUNJARO 10 MG/0.5ML SC SOAJ  Insurance Company: [employer] - Phone [phone] Fax [phone]  Expected CoPay: $    Pharmacy Filling the Rx: [facility] [address] [place] [address]  Pharmacy Notified: no  Patient Notified: no    Key: [id number]     Will run test claim on 10/28 to confirm NO PA Required

## 2024-10-17 NOTE — TELEPHONE ENCOUNTER
PA Initiation    Medication: MOUNJARO 10 MG/0.5ML SC SOAJ  Insurance Company: [employer] [place] - Phone [phone] Fax [phone]  Pharmacy Filling the Rx: [facility] [address] [place], [place] [id number]  Filling Pharmacy Phone:    Filling Pharmacy Fax:    Start Date: 10/17/2024    Key: [id number]

## 2024-12-15 ENCOUNTER — HEALTH MAINTENANCE LETTER (OUTPATIENT)
Age: 54
End: 2024-12-15

## 2025-03-16 ENCOUNTER — HEALTH MAINTENANCE LETTER (OUTPATIENT)
Age: 55
End: 2025-03-16

## 2025-06-29 ENCOUNTER — HEALTH MAINTENANCE LETTER (OUTPATIENT)
Age: 55
End: 2025-06-29

## 2025-08-10 ENCOUNTER — HEALTH MAINTENANCE LETTER (OUTPATIENT)
Age: 55
End: 2025-08-10